# Patient Record
Sex: FEMALE | Race: WHITE | NOT HISPANIC OR LATINO | Employment: UNEMPLOYED | URBAN - METROPOLITAN AREA
[De-identification: names, ages, dates, MRNs, and addresses within clinical notes are randomized per-mention and may not be internally consistent; named-entity substitution may affect disease eponyms.]

---

## 2020-01-01 ENCOUNTER — HOSPITAL ENCOUNTER (INPATIENT)
Facility: HOSPITAL | Age: 80
LOS: 1 days | End: 2020-04-13
Attending: EMERGENCY MEDICINE | Admitting: INTERNAL MEDICINE

## 2020-01-01 ENCOUNTER — APPOINTMENT (OUTPATIENT)
Dept: CT IMAGING | Facility: HOSPITAL | Age: 80
End: 2020-01-01

## 2020-01-01 ENCOUNTER — APPOINTMENT (OUTPATIENT)
Dept: GENERAL RADIOLOGY | Facility: HOSPITAL | Age: 80
End: 2020-01-01

## 2020-01-01 ENCOUNTER — APPOINTMENT (OUTPATIENT)
Dept: CARDIOLOGY | Facility: HOSPITAL | Age: 80
End: 2020-01-01

## 2020-01-01 VITALS
OXYGEN SATURATION: 91 % | RESPIRATION RATE: 16 BRPM | WEIGHT: 128 LBS | DIASTOLIC BLOOD PRESSURE: 43 MMHG | BODY MASS INDEX: 22.68 KG/M2 | HEART RATE: 88 BPM | TEMPERATURE: 97.7 F | SYSTOLIC BLOOD PRESSURE: 116 MMHG | HEIGHT: 63 IN

## 2020-01-01 DIAGNOSIS — A41.9 SEPTIC SHOCK (HCC): ICD-10-CM

## 2020-01-01 DIAGNOSIS — N39.0 ACUTE UTI: ICD-10-CM

## 2020-01-01 DIAGNOSIS — J90 PLEURAL EFFUSION: Primary | ICD-10-CM

## 2020-01-01 DIAGNOSIS — R65.21 SEPTIC SHOCK (HCC): ICD-10-CM

## 2020-01-01 LAB
ALBUMIN SERPL-MCNC: 2.3 G/DL (ref 3.5–5.2)
ALBUMIN SERPL-MCNC: 2.9 G/DL (ref 3.5–5.2)
ALBUMIN/GLOB SERPL: 1 G/DL
ALBUMIN/GLOB SERPL: 1 G/DL
ALP SERPL-CCNC: 52 U/L (ref 39–117)
ALP SERPL-CCNC: 61 U/L (ref 39–117)
ALT SERPL W P-5'-P-CCNC: 18 U/L (ref 1–33)
ALT SERPL W P-5'-P-CCNC: 23 U/L (ref 1–33)
AMPHET+METHAMPHET UR QL: NEGATIVE
ANION GAP SERPL CALCULATED.3IONS-SCNC: 14 MMOL/L (ref 5–15)
ANION GAP SERPL CALCULATED.3IONS-SCNC: 16 MMOL/L (ref 5–15)
ANION GAP SERPL CALCULATED.3IONS-SCNC: 25 MMOL/L (ref 5–15)
ANION GAP SERPL CALCULATED.3IONS-SCNC: 27 MMOL/L (ref 5–15)
ANION GAP SERPL CALCULATED.3IONS-SCNC: 28 MMOL/L (ref 5–15)
ANION GAP SERPL CALCULATED.3IONS-SCNC: 29 MMOL/L (ref 5–15)
APTT PPP: 22 SECONDS (ref 24–31)
ARTERIAL PATENCY WRIST A: ABNORMAL
ARTERIAL PATENCY WRIST A: POSITIVE
ARTERIAL PATENCY WRIST A: POSITIVE
AST SERPL-CCNC: 30 U/L (ref 1–32)
AST SERPL-CCNC: 39 U/L (ref 1–32)
ATMOSPHERIC PRESS: ABNORMAL MM[HG]
B PERT DNA SPEC QL NAA+PROBE: NOT DETECTED
BACTERIA BLD CULT: ABNORMAL
BACTERIA UR QL AUTO: ABNORMAL /HPF
BARBITURATES UR QL SCN: NEGATIVE
BASE DEFICIT: -9.9 MEQ/LITER
BASE EXCESS BLDA CALC-SCNC: -10.5 MMOL/L (ref 0–3)
BASE EXCESS BLDA CALC-SCNC: -10.5 MMOL/L (ref 0–3)
BASE EXCESS BLDA CALC-SCNC: -10.7 MMOL/L (ref 0–3)
BASE EXCESS BLDA CALC-SCNC: -16.2 MMOL/L (ref 0–3)
BASE EXCESS BLDA CALC-SCNC: -3.4 MMOL/L (ref 0–3)
BASE EXCESS BLDA CALC-SCNC: -8.2 MMOL/L (ref 0–3)
BASE EXCESS BLDA CALC-SCNC: -9.8 MMOL/L (ref 0–3)
BASE EXCESS BLDV CALC-SCNC: -2.5 MMOL/L
BASOPHILS # BLD AUTO: 0 10*3/MM3 (ref 0–0.2)
BASOPHILS NFR BLD AUTO: 0.2 % (ref 0–1.5)
BASOPHILS NFR BLD AUTO: 0.2 % (ref 0–1.5)
BASOPHILS NFR BLD AUTO: 0.4 % (ref 0–1.5)
BDY SITE: ABNORMAL
BENZODIAZ UR QL SCN: NEGATIVE
BH CV ECHO MEAS - % IVS THICK: 17 %
BH CV ECHO MEAS - % LVPW THICK: 29.5 %
BH CV ECHO MEAS - ACS: 0.52 CM
BH CV ECHO MEAS - AI DEC SLOPE: 610.9 CM/SEC^2
BH CV ECHO MEAS - AI DEC TIME: 0.5 SEC
BH CV ECHO MEAS - AI MAX PG: 37.7 MMHG
BH CV ECHO MEAS - AI MAX VEL: 306.8 CM/SEC
BH CV ECHO MEAS - AI P1/2T: 147.1 MSEC
BH CV ECHO MEAS - AO MAX PG (FULL): 11.4 MMHG
BH CV ECHO MEAS - AO MAX PG: 13.6 MMHG
BH CV ECHO MEAS - AO MEAN PG (FULL): 5.1 MMHG
BH CV ECHO MEAS - AO MEAN PG: 5.7 MMHG
BH CV ECHO MEAS - AO ROOT AREA (BSA CORRECTED): 1.7
BH CV ECHO MEAS - AO ROOT AREA: 5.5 CM^2
BH CV ECHO MEAS - AO ROOT DIAM: 2.7 CM
BH CV ECHO MEAS - AO V2 MAX: 182.6 CM/SEC
BH CV ECHO MEAS - AO V2 MEAN: 106.1 CM/SEC
BH CV ECHO MEAS - AO V2 VTI: 25.6 CM
BH CV ECHO MEAS - AVA(I,A): 0.87 CM^2
BH CV ECHO MEAS - AVA(I,D): 0.87 CM^2
BH CV ECHO MEAS - AVA(V,A): 1.1 CM^2
BH CV ECHO MEAS - AVA(V,D): 1.1 CM^2
BH CV ECHO MEAS - BSA(HAYCOCK): 1.6 M^2
BH CV ECHO MEAS - BSA: 1.6 M^2
BH CV ECHO MEAS - BZI_BMI: 22.7 KILOGRAMS/M^2
BH CV ECHO MEAS - BZI_METRIC_HEIGHT: 160 CM
BH CV ECHO MEAS - BZI_METRIC_WEIGHT: 58.1 KG
BH CV ECHO MEAS - EDV(CUBED): 110.9 ML
BH CV ECHO MEAS - EDV(MOD-SP2): 89.6 ML
BH CV ECHO MEAS - EDV(MOD-SP4): 71.6 ML
BH CV ECHO MEAS - EDV(TEICH): 107.7 ML
BH CV ECHO MEAS - EF(CUBED): 44.9 %
BH CV ECHO MEAS - EF(MOD-BP): 27 %
BH CV ECHO MEAS - EF(MOD-SP2): 29.2 %
BH CV ECHO MEAS - EF(MOD-SP4): 28.6 %
BH CV ECHO MEAS - EF(TEICH): 37.3 %
BH CV ECHO MEAS - ESV(CUBED): 61.1 ML
BH CV ECHO MEAS - ESV(MOD-SP2): 63.4 ML
BH CV ECHO MEAS - ESV(MOD-SP4): 51.1 ML
BH CV ECHO MEAS - ESV(TEICH): 67.5 ML
BH CV ECHO MEAS - FS: 18 %
BH CV ECHO MEAS - IVS/LVPW: 1.1
BH CV ECHO MEAS - IVSD: 1.2 CM
BH CV ECHO MEAS - IVSS: 1.4 CM
BH CV ECHO MEAS - LA DIMENSION(2D): 4 CM
BH CV ECHO MEAS - LV DIASTOLIC VOL/BSA (35-75): 44.7 ML/M^2
BH CV ECHO MEAS - LV MASS(C)D: 210.8 GRAMS
BH CV ECHO MEAS - LV MASS(C)DI: 131.8 GRAMS/M^2
BH CV ECHO MEAS - LV MASS(C)S: 212.6 GRAMS
BH CV ECHO MEAS - LV MASS(C)SI: 132.9 GRAMS/M^2
BH CV ECHO MEAS - LV MAX PG: 2.1 MMHG
BH CV ECHO MEAS - LV MEAN PG: 0.66 MMHG
BH CV ECHO MEAS - LV SYSTOLIC VOL/BSA (12-30): 31.9 ML/M^2
BH CV ECHO MEAS - LV V1 MAX: 72.7 CM/SEC
BH CV ECHO MEAS - LV V1 MEAN: 36.6 CM/SEC
BH CV ECHO MEAS - LV V1 VTI: 8.3 CM
BH CV ECHO MEAS - LVIDD: 4.8 CM
BH CV ECHO MEAS - LVIDS: 3.9 CM
BH CV ECHO MEAS - LVOT AREA: 2.7 CM^2
BH CV ECHO MEAS - LVOT DIAM: 1.8 CM
BH CV ECHO MEAS - LVPWD: 1.1 CM
BH CV ECHO MEAS - LVPWS: 1.5 CM
BH CV ECHO MEAS - MV A MAX VEL: 24.4 CM/SEC
BH CV ECHO MEAS - MV DEC SLOPE: 964.3 CM/SEC^2
BH CV ECHO MEAS - MV DEC TIME: 0.13 SEC
BH CV ECHO MEAS - MV E MAX VEL: 61.9 CM/SEC
BH CV ECHO MEAS - MV E/A: 2.5
BH CV ECHO MEAS - MV MAX PG: 7.7 MMHG
BH CV ECHO MEAS - MV MEAN PG: 3 MMHG
BH CV ECHO MEAS - MV V2 MAX: 138.4 CM/SEC
BH CV ECHO MEAS - MV V2 MEAN: 82.3 CM/SEC
BH CV ECHO MEAS - MV V2 VTI: 25.1 CM
BH CV ECHO MEAS - MVA(VTI): 0.89 CM^2
BH CV ECHO MEAS - PA ACC TIME: 0.06 SEC
BH CV ECHO MEAS - PA MAX PG (FULL): 0.84 MMHG
BH CV ECHO MEAS - PA MAX PG: 1.2 MMHG
BH CV ECHO MEAS - PA MEAN PG (FULL): 0.46 MMHG
BH CV ECHO MEAS - PA MEAN PG: 0.72 MMHG
BH CV ECHO MEAS - PA PR(ACCEL): 54.1 MMHG
BH CV ECHO MEAS - PA V2 MAX: 55.7 CM/SEC
BH CV ECHO MEAS - PA V2 MEAN: 40 CM/SEC
BH CV ECHO MEAS - PA V2 VTI: 5.3 CM
BH CV ECHO MEAS - RAP SYSTOLE: 3 MMHG
BH CV ECHO MEAS - RV MAX PG: 0.4 MMHG
BH CV ECHO MEAS - RV MEAN PG: 0.26 MMHG
BH CV ECHO MEAS - RV V1 MAX: 31.7 CM/SEC
BH CV ECHO MEAS - RV V1 MEAN: 24.6 CM/SEC
BH CV ECHO MEAS - RV V1 VTI: 3.4 CM
BH CV ECHO MEAS - RVDD: 2.9 CM
BH CV ECHO MEAS - RVSP: 33.2 MMHG
BH CV ECHO MEAS - SI(AO): 88.6 ML/M^2
BH CV ECHO MEAS - SI(CUBED): 31.1 ML/M^2
BH CV ECHO MEAS - SI(LVOT): 13.9 ML/M^2
BH CV ECHO MEAS - SI(MOD-SP2): 16.4 ML/M^2
BH CV ECHO MEAS - SI(MOD-SP4): 12.8 ML/M^2
BH CV ECHO MEAS - SI(TEICH): 25.1 ML/M^2
BH CV ECHO MEAS - SV(AO): 141.7 ML
BH CV ECHO MEAS - SV(CUBED): 49.7 ML
BH CV ECHO MEAS - SV(LVOT): 22.3 ML
BH CV ECHO MEAS - SV(MOD-SP2): 26.2 ML
BH CV ECHO MEAS - SV(MOD-SP4): 20.5 ML
BH CV ECHO MEAS - SV(TEICH): 40.2 ML
BH CV ECHO MEAS - TR MAX VEL: 274.4 CM/SEC
BILIRUB SERPL-MCNC: 0.7 MG/DL (ref 0.2–1.2)
BILIRUB SERPL-MCNC: 0.9 MG/DL (ref 0.2–1.2)
BILIRUB UR QL STRIP: ABNORMAL
BOTTLE TYPE: ABNORMAL
BUN BLD-MCNC: 33 MG/DL (ref 8–23)
BUN BLD-MCNC: 37 MG/DL (ref 8–23)
BUN BLD-MCNC: 38 MG/DL (ref 8–23)
BUN BLD-MCNC: 40 MG/DL (ref 8–23)
BUN BLD-MCNC: 41 MG/DL (ref 8–23)
BUN BLD-MCNC: 42 MG/DL (ref 8–23)
BUN/CREAT SERPL: 19 (ref 7–25)
BUN/CREAT SERPL: 19.1 (ref 7–25)
BUN/CREAT SERPL: 19.2 (ref 7–25)
BUN/CREAT SERPL: 19.6 (ref 7–25)
BUN/CREAT SERPL: 21.7 (ref 7–25)
BUN/CREAT SERPL: 21.9 (ref 7–25)
C PNEUM DNA NPH QL NAA+NON-PROBE: NOT DETECTED
CA-I BLDA-SCNC: 0.98 MMOL/L (ref 1.15–1.33)
CA-I SERPL ISE-MCNC: 1.22 MMOL/L (ref 1.2–1.3)
CALCIUM SPEC-SCNC: 6.9 MG/DL (ref 8.6–10.5)
CALCIUM SPEC-SCNC: 7.2 MG/DL (ref 8.6–10.5)
CALCIUM SPEC-SCNC: 7.5 MG/DL (ref 8.6–10.5)
CALCIUM SPEC-SCNC: 8.4 MG/DL (ref 8.6–10.5)
CALCIUM SPEC-SCNC: 9 MG/DL (ref 8.6–10.5)
CALCIUM SPEC-SCNC: 9.2 MG/DL (ref 8.6–10.5)
CANNABINOIDS SERPL QL: NEGATIVE
CHLORIDE SERPL-SCNC: 101 MMOL/L (ref 98–107)
CHLORIDE SERPL-SCNC: 103 MMOL/L (ref 98–107)
CHLORIDE SERPL-SCNC: 104 MMOL/L (ref 98–107)
CHLORIDE SERPL-SCNC: 107 MMOL/L (ref 98–107)
CHLORIDE SERPL-SCNC: 109 MMOL/L (ref 98–107)
CHLORIDE SERPL-SCNC: 113 MMOL/L (ref 98–107)
CHOLEST SERPL-MCNC: 94 MG/DL (ref 0–200)
CK SERPL-CCNC: 121 U/L (ref 20–180)
CK SERPL-CCNC: 188 U/L (ref 20–180)
CK SERPL-CCNC: 195 U/L (ref 20–180)
CLARITY UR: ABNORMAL
CO2 BLDA-SCNC: 14.7 MMOL/L (ref 22–29)
CO2 BLDA-SCNC: 18 MMOL/L (ref 22–29)
CO2 BLDA-SCNC: 19.7 MMOL/L (ref 22–29)
CO2 BLDA-SCNC: 23.5 MMOL/L (ref 22–29)
CO2 BLDA-SCNC: 27.6 MMOL/L (ref 22–29)
CO2 BLDA-SCNC: 29.7 MMOL/L (ref 22–29)
CO2 SERPL-SCNC: 13 MMOL/L (ref 22–29)
CO2 SERPL-SCNC: 13 MMOL/L (ref 22–29)
CO2 SERPL-SCNC: 16 MMOL/L (ref 22–29)
CO2 SERPL-SCNC: 17 MMOL/L (ref 22–29)
CO2 SERPL-SCNC: 17 MMOL/L (ref 22–29)
CO2 SERPL-SCNC: 27 MMOL/L (ref 22–29)
COCAINE UR QL: NEGATIVE
COLOR UR: ABNORMAL
CREAT BLD-MCNC: 1.52 MG/DL (ref 0.57–1)
CREAT BLD-MCNC: 1.69 MG/DL (ref 0.57–1)
CREAT BLD-MCNC: 1.94 MG/DL (ref 0.57–1)
CREAT BLD-MCNC: 2.1 MG/DL (ref 0.57–1)
CREAT BLD-MCNC: 2.14 MG/DL (ref 0.57–1)
CREAT BLD-MCNC: 2.2 MG/DL (ref 0.57–1)
CRP SERPL-MCNC: 3.24 MG/DL (ref 0–0.5)
CRP SERPL-MCNC: 5.11 MG/DL (ref 0–0.5)
D-LACTATE SERPL-SCNC: 1.7 MMOL/L (ref 0.5–2)
D-LACTATE SERPL-SCNC: 11.9 MMOL/L (ref 0.5–2)
D-LACTATE SERPL-SCNC: 2.2 MMOL/L (ref 0.5–2)
D-LACTATE SERPL-SCNC: 2.9 MMOL/L (ref 0.5–2)
D-LACTATE SERPL-SCNC: 6.7 MMOL/L (ref 0.5–2)
DEPRECATED RDW RBC AUTO: 63 FL (ref 37–54)
DEPRECATED RDW RBC AUTO: 67.8 FL (ref 37–54)
DEPRECATED RDW RBC AUTO: 70 FL (ref 37–54)
DEPRECATED RDW RBC AUTO: 70 FL (ref 37–54)
EOSINOPHIL # BLD AUTO: 0 10*3/MM3 (ref 0–0.4)
EOSINOPHIL NFR BLD AUTO: 0 % (ref 0.3–6.2)
EOSINOPHIL NFR BLD AUTO: 0 % (ref 0.3–6.2)
EOSINOPHIL NFR BLD AUTO: 0.1 % (ref 0.3–6.2)
ERYTHROCYTE [DISTWIDTH] IN BLOOD BY AUTOMATED COUNT: 17.7 % (ref 12.3–15.4)
ERYTHROCYTE [DISTWIDTH] IN BLOOD BY AUTOMATED COUNT: 18.7 % (ref 12.3–15.4)
ERYTHROCYTE [DISTWIDTH] IN BLOOD BY AUTOMATED COUNT: 18.8 % (ref 12.3–15.4)
ERYTHROCYTE [DISTWIDTH] IN BLOOD BY AUTOMATED COUNT: 18.9 % (ref 12.3–15.4)
ERYTHROCYTE [SEDIMENTATION RATE] IN BLOOD: 10 MM/HR (ref 0–30)
ETHANOL UR QL: <0.01 %
FERRITIN SERPL-MCNC: 2603 NG/ML (ref 13–150)
FLUAV H1 2009 PAND RNA NPH QL NAA+PROBE: NOT DETECTED
FLUAV H1 HA GENE NPH QL NAA+PROBE: NOT DETECTED
FLUAV H3 RNA NPH QL NAA+PROBE: NOT DETECTED
FLUAV SUBTYP SPEC NAA+PROBE: NOT DETECTED
FLUBV RNA ISLT QL NAA+PROBE: NOT DETECTED
GFR SERPL CREATININE-BSD FRML MDRD: 22 ML/MIN/1.73
GFR SERPL CREATININE-BSD FRML MDRD: 22 ML/MIN/1.73
GFR SERPL CREATININE-BSD FRML MDRD: 23 ML/MIN/1.73
GFR SERPL CREATININE-BSD FRML MDRD: 25 ML/MIN/1.73
GFR SERPL CREATININE-BSD FRML MDRD: 29 ML/MIN/1.73
GFR SERPL CREATININE-BSD FRML MDRD: 33 ML/MIN/1.73
GLOBULIN UR ELPH-MCNC: 2.3 GM/DL
GLOBULIN UR ELPH-MCNC: 3 GM/DL
GLUCOSE BLD-MCNC: 133 MG/DL (ref 65–99)
GLUCOSE BLD-MCNC: 206 MG/DL (ref 65–99)
GLUCOSE BLD-MCNC: 207 MG/DL (ref 65–99)
GLUCOSE BLD-MCNC: 81 MG/DL (ref 65–99)
GLUCOSE BLD-MCNC: 87 MG/DL (ref 65–99)
GLUCOSE BLD-MCNC: 95 MG/DL (ref 65–99)
GLUCOSE BLDC GLUCOMTR-MCNC: 121 MG/DL (ref 74–100)
GLUCOSE BLDC GLUCOMTR-MCNC: 92 MG/DL (ref 70–105)
GLUCOSE UR STRIP-MCNC: NEGATIVE MG/DL
HADV DNA SPEC NAA+PROBE: NOT DETECTED
HCO3 BLDA-SCNC: 13.4 MMOL/L (ref 21–28)
HCO3 BLDA-SCNC: 16.7 MMOL/L (ref 21–28)
HCO3 BLDA-SCNC: 18.1 MMOL/L (ref 21–28)
HCO3 BLDA-SCNC: 18.2 MMOL/L (ref 21–28)
HCO3 BLDA-SCNC: 21.6 MMOL/L (ref 21–28)
HCO3 BLDA-SCNC: 25.7 MMOL/L (ref 21–28)
HCO3 BLDV-SCNC: 27.5 MMOL/L
HCO3 MIXED: 18.3 MMOL/L (ref 21–29)
HCOV 229E RNA SPEC QL NAA+PROBE: NOT DETECTED
HCOV HKU1 RNA SPEC QL NAA+PROBE: NOT DETECTED
HCOV NL63 RNA SPEC QL NAA+PROBE: NOT DETECTED
HCOV OC43 RNA SPEC QL NAA+PROBE: NOT DETECTED
HCT VFR BLD AUTO: 36.2 % (ref 34–46.6)
HCT VFR BLD AUTO: 36.7 % (ref 34–46.6)
HCT VFR BLD AUTO: 38.1 % (ref 34–46.6)
HCT VFR BLD AUTO: 38.9 % (ref 34–46.6)
HCT VFR BLDA CALC: 40 % (ref 38–51)
HDLC SERPL-MCNC: 37 MG/DL (ref 40–60)
HEMOCCULT STL QL IA: POSITIVE
HEMODILUTION: NO
HGB BLD-MCNC: 11.7 G/DL (ref 12–15.9)
HGB BLD-MCNC: 11.9 G/DL (ref 12–15.9)
HGB BLD-MCNC: 12.1 G/DL (ref 12–15.9)
HGB BLD-MCNC: 13.2 G/DL (ref 12–15.9)
HGB BLDA-MCNC: 13.8 G/DL (ref 12–17)
HGB UR QL STRIP.AUTO: ABNORMAL
HMPV RNA NPH QL NAA+NON-PROBE: NOT DETECTED
HOLD SPECIMEN: NORMAL
HOROWITZ INDEX BLD+IHG-RTO: 100 %
HOROWITZ INDEX BLD+IHG-RTO: 28 %
HOROWITZ INDEX BLD+IHG-RTO: 70 %
HOROWITZ INDEX BLD+IHG-RTO: <21 %
HPIV1 RNA SPEC QL NAA+PROBE: NOT DETECTED
HPIV2 RNA SPEC QL NAA+PROBE: NOT DETECTED
HPIV3 RNA NPH QL NAA+PROBE: NOT DETECTED
HPIV4 P GENE NPH QL NAA+PROBE: NOT DETECTED
HYALINE CASTS UR QL AUTO: ABNORMAL /LPF
INR PPP: 1.09 (ref 0.9–1.1)
KETONES UR QL STRIP: ABNORMAL
L PNEUMO1 AG UR QL IA: NEGATIVE
LACTATE HOLD SPECIMEN: NORMAL
LDH SERPL-CCNC: 330 U/L (ref 135–214)
LDH SERPL-CCNC: 382 U/L (ref 135–214)
LDLC SERPL CALC-MCNC: 36 MG/DL (ref 0–100)
LDLC/HDLC SERPL: 0.97 {RATIO}
LEUKOCYTE ESTERASE UR QL STRIP.AUTO: ABNORMAL
LYMPHOCYTES # BLD AUTO: 0.2 10*3/MM3 (ref 0.7–3.1)
LYMPHOCYTES # BLD AUTO: 0.3 10*3/MM3 (ref 0.7–3.1)
LYMPHOCYTES # BLD AUTO: 0.5 10*3/MM3 (ref 0.7–3.1)
LYMPHOCYTES NFR BLD AUTO: 2.6 % (ref 19.6–45.3)
LYMPHOCYTES NFR BLD AUTO: 3 % (ref 19.6–45.3)
LYMPHOCYTES NFR BLD AUTO: 5.4 % (ref 19.6–45.3)
M PNEUMO IGG SER IA-ACNC: NOT DETECTED
MAGNESIUM SERPL-MCNC: 1.6 MG/DL (ref 1.6–2.4)
MAGNESIUM SERPL-MCNC: 1.7 MG/DL (ref 1.6–2.4)
MAGNESIUM SERPL-MCNC: 1.8 MG/DL (ref 1.6–2.4)
MAGNESIUM SERPL-MCNC: 1.8 MG/DL (ref 1.6–2.4)
MAXIMAL PREDICTED HEART RATE: 141 BPM
MCH RBC QN AUTO: 33.4 PG (ref 26.6–33)
MCH RBC QN AUTO: 33.6 PG (ref 26.6–33)
MCH RBC QN AUTO: 33.8 PG (ref 26.6–33)
MCH RBC QN AUTO: 34.4 PG (ref 26.6–33)
MCHC RBC AUTO-ENTMCNC: 31.8 G/DL (ref 31.5–35.7)
MCHC RBC AUTO-ENTMCNC: 32.2 G/DL (ref 31.5–35.7)
MCHC RBC AUTO-ENTMCNC: 32.3 G/DL (ref 31.5–35.7)
MCHC RBC AUTO-ENTMCNC: 33.9 G/DL (ref 31.5–35.7)
MCV RBC AUTO: 101.4 FL (ref 79–97)
MCV RBC AUTO: 104.6 FL (ref 79–97)
MCV RBC AUTO: 104.6 FL (ref 79–97)
MCV RBC AUTO: 105.1 FL (ref 79–97)
METHADONE UR QL SCN: NEGATIVE
MODALITY: ABNORMAL
MONOCYTES # BLD AUTO: 0.6 10*3/MM3 (ref 0.1–0.9)
MONOCYTES # BLD AUTO: 0.6 10*3/MM3 (ref 0.1–0.9)
MONOCYTES # BLD AUTO: 0.7 10*3/MM3 (ref 0.1–0.9)
MONOCYTES NFR BLD AUTO: 6.2 % (ref 5–12)
MONOCYTES NFR BLD AUTO: 6.5 % (ref 5–12)
MONOCYTES NFR BLD AUTO: 7.9 % (ref 5–12)
MRSA DNA SPEC QL NAA+PROBE: NORMAL
MYOGLOBIN SERPL-MCNC: 370.2 NG/ML (ref 25–58)
NEUTROPHILS # BLD AUTO: 10.1 10*3/MM3 (ref 1.7–7)
NEUTROPHILS # BLD AUTO: 7 10*3/MM3 (ref 1.7–7)
NEUTROPHILS # BLD AUTO: 8.1 10*3/MM3 (ref 1.7–7)
NEUTROPHILS NFR BLD AUTO: 87.8 % (ref 42.7–76)
NEUTROPHILS NFR BLD AUTO: 89.3 % (ref 42.7–76)
NEUTROPHILS NFR BLD AUTO: 90.4 % (ref 42.7–76)
NITRITE UR QL STRIP: POSITIVE
NRBC BLD AUTO-RTO: 0.2 /100 WBC (ref 0–0.2)
NRBC BLD AUTO-RTO: 1.2 /100 WBC (ref 0–0.2)
NRBC BLD AUTO-RTO: 1.9 /100 WBC (ref 0–0.2)
NT-PROBNP SERPL-MCNC: ABNORMAL PG/ML (ref 5–1800)
O2 SATURATION MIXED: 59.5 %
OPIATES UR QL: NEGATIVE
OXYCODONE UR QL SCN: NEGATIVE
PCO2 BLDA: 40.8 MM HG (ref 35–48)
PCO2 BLDA: 44.8 MM HG (ref 35–48)
PCO2 BLDA: 50.2 MM HG (ref 35–48)
PCO2 BLDA: 51.3 MM HG (ref 35–48)
PCO2 BLDA: 61.8 MM HG (ref 35–48)
PCO2 BLDA: 63.2 MM HG (ref 35–48)
PCO2 BLDV: 72.3 MM HG (ref 42–51)
PCO2 MIXED: 47.4 MMHG (ref 35–51)
PEEP RESPIRATORY: 10 CM[H2O]
PEEP RESPIRATORY: 5 CM[H2O]
PH BLDA: 7.08 PH UNITS (ref 7.35–7.45)
PH BLDA: 7.14 PH UNITS (ref 7.35–7.45)
PH BLDA: 7.16 PH UNITS (ref 7.35–7.45)
PH BLDA: 7.17 PH UNITS (ref 7.35–7.45)
PH BLDA: 7.22 PH UNITS (ref 7.35–7.45)
PH BLDA: 7.23 PH UNITS (ref 7.35–7.45)
PH BLDV: 7.19 PH UNITS (ref 7.32–7.43)
PH MIXED: 7.19 PH UNITS (ref 7.32–7.45)
PH UR STRIP.AUTO: 5.5 [PH] (ref 5–8)
PHOSPHATE SERPL-MCNC: 5.6 MG/DL (ref 2.5–4.5)
PHOSPHATE SERPL-MCNC: 6.5 MG/DL (ref 2.5–4.5)
PHOSPHATE SERPL-MCNC: 7 MG/DL (ref 2.5–4.5)
PLATELET # BLD AUTO: 133 10*3/MM3 (ref 140–450)
PLATELET # BLD AUTO: 152 10*3/MM3 (ref 140–450)
PLATELET # BLD AUTO: 153 10*3/MM3 (ref 140–450)
PLATELET # BLD AUTO: 158 10*3/MM3 (ref 140–450)
PMV BLD AUTO: 10.2 FL (ref 6–12)
PMV BLD AUTO: 9.8 FL (ref 6–12)
PO2 BLDA: 124.4 MM HG (ref 83–108)
PO2 BLDA: 61.3 MM HG (ref 83–108)
PO2 BLDA: 63.4 MM HG (ref 83–108)
PO2 BLDA: 64.9 MM HG (ref 83–108)
PO2 BLDA: 67 MM HG (ref 83–108)
PO2 BLDA: 93.1 MM HG (ref 83–108)
PO2 BLDV: 20.8 MM HG
PO2 MIXED: 38.1 MMHG
POTASSIUM BLD-SCNC: 4.6 MMOL/L (ref 3.5–5.2)
POTASSIUM BLD-SCNC: 4.7 MMOL/L (ref 3.5–5.2)
POTASSIUM BLD-SCNC: 4.9 MMOL/L (ref 3.5–5.2)
POTASSIUM BLD-SCNC: 5 MMOL/L (ref 3.5–5.2)
POTASSIUM BLD-SCNC: 5.1 MMOL/L (ref 3.5–5.2)
POTASSIUM BLD-SCNC: 5.6 MMOL/L (ref 3.5–5.2)
POTASSIUM BLDA-SCNC: 5.2 MMOL/L (ref 3.5–4.5)
PROCALCITONIN SERPL-MCNC: 0.25 NG/ML (ref 0.1–0.25)
PROT SERPL-MCNC: 4.6 G/DL (ref 6–8.5)
PROT SERPL-MCNC: 5.9 G/DL (ref 6–8.5)
PROT UR QL STRIP: ABNORMAL
PROTHROMBIN TIME: 11.3 SECONDS (ref 9.6–11.7)
RBC # BLD AUTO: 3.46 10*6/MM3 (ref 3.77–5.28)
RBC # BLD AUTO: 3.51 10*6/MM3 (ref 3.77–5.28)
RBC # BLD AUTO: 3.63 10*6/MM3 (ref 3.77–5.28)
RBC # BLD AUTO: 3.84 10*6/MM3 (ref 3.77–5.28)
RBC # UR: ABNORMAL /HPF
REF LAB TEST METHOD: ABNORMAL
RESPIRATORY RATE: 24
RESPIRATORY RATE: 30
RESPIRATORY RATE: 30
RESPIRATORY RATE: 34
RHINOVIRUS RNA SPEC NAA+PROBE: NOT DETECTED
RSV RNA NPH QL NAA+NON-PROBE: NOT DETECTED
S PNEUM AG SPEC QL LA: NEGATIVE
SAO2 % BLDCOA: 83.5 % (ref 94–98)
SAO2 % BLDCOA: 83.7 % (ref 94–98)
SAO2 % BLDCOA: 84.1 % (ref 94–98)
SAO2 % BLDCOA: 87.3 % (ref 94–98)
SAO2 % BLDCOA: 94.4 % (ref 94–98)
SAO2 % BLDCOA: 98 % (ref 94–98)
SAO2 % BLDCOV: 22.5 %
SARS-COV-2 RNA RESP QL NAA+PROBE: NOT DETECTED
SET MECH RESP RATE: 34
SODIUM BLD-SCNC: 143 MMOL/L (ref 136–145)
SODIUM BLD-SCNC: 144 MMOL/L (ref 136–145)
SODIUM BLD-SCNC: 146 MMOL/L (ref 136–145)
SODIUM BLD-SCNC: 147 MMOL/L (ref 136–145)
SODIUM BLD-SCNC: 147 MMOL/L (ref 136–145)
SODIUM BLD-SCNC: 152 MMOL/L (ref 136–145)
SODIUM BLDA-SCNC: 140 MMOL/L (ref 138–146)
SP GR UR STRIP: 1.02 (ref 1–1.03)
SQUAMOUS #/AREA URNS HPF: ABNORMAL /HPF
STRESS TARGET HR: 120 BPM
TRIGL SERPL-MCNC: 106 MG/DL (ref 0–150)
TROPONIN T SERPL-MCNC: 0.1 NG/ML (ref 0–0.03)
TROPONIN T SERPL-MCNC: 0.12 NG/ML (ref 0–0.03)
TROPONIN T SERPL-MCNC: 0.12 NG/ML (ref 0–0.03)
TROPONIN T SERPL-MCNC: 0.19 NG/ML (ref 0–0.03)
TSH SERPL DL<=0.05 MIU/L-ACNC: 3.76 UIU/ML (ref 0.27–4.2)
URINE MYOGLOBIN, QUALITATIVE: POSITIVE
UROBILINOGEN UR QL STRIP: ABNORMAL
VANCOMYCIN SERPL-MCNC: 7.4 MCG/ML (ref 5–40)
VENTILATOR MODE: ABNORMAL
VLDLC SERPL-MCNC: 21.2 MG/DL
VT ON VENT VENT: 400 ML
WBC NRBC COR # BLD: 10.5 10*3/MM3 (ref 3.4–10.8)
WBC NRBC COR # BLD: 11.1 10*3/MM3 (ref 3.4–10.8)
WBC NRBC COR # BLD: 7.9 10*3/MM3 (ref 3.4–10.8)
WBC NRBC COR # BLD: 9.3 10*3/MM3 (ref 3.4–10.8)
WBC UR QL AUTO: ABNORMAL /HPF
WHOLE BLOOD HOLD SPECIMEN: NORMAL

## 2020-01-01 PROCEDURE — 25010000002 FENTANYL CITRATE (PF) 100 MCG/2ML SOLUTION

## 2020-01-01 PROCEDURE — 83735 ASSAY OF MAGNESIUM: CPT | Performed by: NURSE PRACTITIONER

## 2020-01-01 PROCEDURE — 93306 TTE W/DOPPLER COMPLETE: CPT

## 2020-01-01 PROCEDURE — 87186 SC STD MICRODIL/AGAR DIL: CPT | Performed by: EMERGENCY MEDICINE

## 2020-01-01 PROCEDURE — 25010000002 CEFEPIME PER 500 MG: Performed by: EMERGENCY MEDICINE

## 2020-01-01 PROCEDURE — 82550 ASSAY OF CK (CPK): CPT | Performed by: NURSE PRACTITIONER

## 2020-01-01 PROCEDURE — 71045 X-RAY EXAM CHEST 1 VIEW: CPT

## 2020-01-01 PROCEDURE — 87899 AGENT NOS ASSAY W/OPTIC: CPT | Performed by: NURSE PRACTITIONER

## 2020-01-01 PROCEDURE — 80307 DRUG TEST PRSMV CHEM ANLYZR: CPT | Performed by: EMERGENCY MEDICINE

## 2020-01-01 PROCEDURE — 25010000002 EPINEPHRINE 1 MG/ML SOLUTION 30 ML VIAL: Performed by: NURSE PRACTITIONER

## 2020-01-01 PROCEDURE — 82803 BLOOD GASES ANY COMBINATION: CPT

## 2020-01-01 PROCEDURE — 85730 THROMBOPLASTIN TIME PARTIAL: CPT | Performed by: EMERGENCY MEDICINE

## 2020-01-01 PROCEDURE — 85025 COMPLETE CBC W/AUTO DIFF WBC: CPT | Performed by: EMERGENCY MEDICINE

## 2020-01-01 PROCEDURE — 80048 BASIC METABOLIC PNL TOTAL CA: CPT | Performed by: NURSE PRACTITIONER

## 2020-01-01 PROCEDURE — 70450 CT HEAD/BRAIN W/O DYE: CPT

## 2020-01-01 PROCEDURE — 85027 COMPLETE CBC AUTOMATED: CPT | Performed by: NURSE PRACTITIONER

## 2020-01-01 PROCEDURE — 83615 LACTATE (LD) (LDH) ENZYME: CPT | Performed by: EMERGENCY MEDICINE

## 2020-01-01 PROCEDURE — P9612 CATHETERIZE FOR URINE SPEC: HCPCS

## 2020-01-01 PROCEDURE — 36600 WITHDRAWAL OF ARTERIAL BLOOD: CPT

## 2020-01-01 PROCEDURE — 0BH17EZ INSERTION OF ENDOTRACHEAL AIRWAY INTO TRACHEA, VIA NATURAL OR ARTIFICIAL OPENING: ICD-10-PCS | Performed by: NURSE PRACTITIONER

## 2020-01-01 PROCEDURE — 85652 RBC SED RATE AUTOMATED: CPT | Performed by: INTERNAL MEDICINE

## 2020-01-01 PROCEDURE — 0099U HC BIOFIRE FILMARRAY RESP PANEL 1: CPT | Performed by: EMERGENCY MEDICINE

## 2020-01-01 PROCEDURE — 82728 ASSAY OF FERRITIN: CPT | Performed by: INTERNAL MEDICINE

## 2020-01-01 PROCEDURE — 25010000002 DOPAMINE PER 40 MG: Performed by: NURSE PRACTITIONER

## 2020-01-01 PROCEDURE — 84443 ASSAY THYROID STIM HORMONE: CPT | Performed by: EMERGENCY MEDICINE

## 2020-01-01 PROCEDURE — 74018 RADEX ABDOMEN 1 VIEW: CPT

## 2020-01-01 PROCEDURE — P9047 ALBUMIN (HUMAN), 25%, 50ML: HCPCS | Performed by: NURSE PRACTITIONER

## 2020-01-01 PROCEDURE — 87077 CULTURE AEROBIC IDENTIFY: CPT | Performed by: EMERGENCY MEDICINE

## 2020-01-01 PROCEDURE — 94799 UNLISTED PULMONARY SVC/PX: CPT

## 2020-01-01 PROCEDURE — 82550 ASSAY OF CK (CPK): CPT | Performed by: EMERGENCY MEDICINE

## 2020-01-01 PROCEDURE — 86140 C-REACTIVE PROTEIN: CPT | Performed by: EMERGENCY MEDICINE

## 2020-01-01 PROCEDURE — 25010000002 EPINEPHRINE PF 1 MG/10ML SOLUTION PREFILLED SYRINGE: Performed by: NURSE PRACTITIONER

## 2020-01-01 PROCEDURE — 93306 TTE W/DOPPLER COMPLETE: CPT | Performed by: INTERNAL MEDICINE

## 2020-01-01 PROCEDURE — 92950 HEART/LUNG RESUSCITATION CPR: CPT

## 2020-01-01 PROCEDURE — 25010000002 HYDROCORTISONE SODIUM SUCCINATE 100 MG RECONSTITUTED SOLUTION: Performed by: NURSE PRACTITIONER

## 2020-01-01 PROCEDURE — 25010000002 VANCOMYCIN 750 MG RECONSTITUTED SOLUTION 1 EACH VIAL: Performed by: INTERNAL MEDICINE

## 2020-01-01 PROCEDURE — 25010000002 HEPARIN (PORCINE) PER 1000 UNITS: Performed by: NURSE PRACTITIONER

## 2020-01-01 PROCEDURE — 25010000002 VANCOMYCIN 750 MG RECONSTITUTED SOLUTION 1 EACH VIAL: Performed by: EMERGENCY MEDICINE

## 2020-01-01 PROCEDURE — 25010000002 PHENYLEPHRINE 10 MG/ML SOLUTION: Performed by: NURSE PRACTITIONER

## 2020-01-01 PROCEDURE — 85025 COMPLETE CBC W/AUTO DIFF WBC: CPT | Performed by: NURSE PRACTITIONER

## 2020-01-01 PROCEDURE — 84100 ASSAY OF PHOSPHORUS: CPT | Performed by: EMERGENCY MEDICINE

## 2020-01-01 PROCEDURE — C1751 CATH, INF, PER/CENT/MIDLINE: HCPCS

## 2020-01-01 PROCEDURE — 87086 URINE CULTURE/COLONY COUNT: CPT | Performed by: EMERGENCY MEDICINE

## 2020-01-01 PROCEDURE — 83874 ASSAY OF MYOGLOBIN: CPT | Performed by: NURSE PRACTITIONER

## 2020-01-01 PROCEDURE — 87040 BLOOD CULTURE FOR BACTERIA: CPT | Performed by: EMERGENCY MEDICINE

## 2020-01-01 PROCEDURE — 80053 COMPREHEN METABOLIC PANEL: CPT | Performed by: NURSE PRACTITIONER

## 2020-01-01 PROCEDURE — 93005 ELECTROCARDIOGRAM TRACING: CPT | Performed by: EMERGENCY MEDICINE

## 2020-01-01 PROCEDURE — 84484 ASSAY OF TROPONIN QUANT: CPT | Performed by: EMERGENCY MEDICINE

## 2020-01-01 PROCEDURE — 85018 HEMOGLOBIN: CPT

## 2020-01-01 PROCEDURE — 84100 ASSAY OF PHOSPHORUS: CPT | Performed by: NURSE PRACTITIONER

## 2020-01-01 PROCEDURE — 99285 EMERGENCY DEPT VISIT HI MDM: CPT

## 2020-01-01 PROCEDURE — 25010000002 MIDAZOLAM PER 1 MG

## 2020-01-01 PROCEDURE — 82274 ASSAY TEST FOR BLOOD FECAL: CPT | Performed by: INTERNAL MEDICINE

## 2020-01-01 PROCEDURE — 87150 DNA/RNA AMPLIFIED PROBE: CPT | Performed by: EMERGENCY MEDICINE

## 2020-01-01 PROCEDURE — 03HY32Z INSERTION OF MONITORING DEVICE INTO UPPER ARTERY, PERCUTANEOUS APPROACH: ICD-10-PCS | Performed by: NURSE PRACTITIONER

## 2020-01-01 PROCEDURE — 25010000002 CEFEPIME PER 500 MG: Performed by: INTERNAL MEDICINE

## 2020-01-01 PROCEDURE — 25010000002 ALBUMIN HUMAN 25% PER 50 ML: Performed by: NURSE PRACTITIONER

## 2020-01-01 PROCEDURE — 82330 ASSAY OF CALCIUM: CPT

## 2020-01-01 PROCEDURE — 81001 URINALYSIS AUTO W/SCOPE: CPT | Performed by: EMERGENCY MEDICINE

## 2020-01-01 PROCEDURE — 86140 C-REACTIVE PROTEIN: CPT | Performed by: INTERNAL MEDICINE

## 2020-01-01 PROCEDURE — 87641 MR-STAPH DNA AMP PROBE: CPT | Performed by: NURSE PRACTITIONER

## 2020-01-01 PROCEDURE — 80053 COMPREHEN METABOLIC PANEL: CPT | Performed by: EMERGENCY MEDICINE

## 2020-01-01 PROCEDURE — 94640 AIRWAY INHALATION TREATMENT: CPT

## 2020-01-01 PROCEDURE — 82330 ASSAY OF CALCIUM: CPT | Performed by: EMERGENCY MEDICINE

## 2020-01-01 PROCEDURE — 05HM33Z INSERTION OF INFUSION DEVICE INTO RIGHT INTERNAL JUGULAR VEIN, PERCUTANEOUS APPROACH: ICD-10-PCS | Performed by: NURSE PRACTITIONER

## 2020-01-01 PROCEDURE — 25010000002 DOPAMINE PER 40 MG

## 2020-01-01 PROCEDURE — 83735 ASSAY OF MAGNESIUM: CPT | Performed by: EMERGENCY MEDICINE

## 2020-01-01 PROCEDURE — 87147 CULTURE TYPE IMMUNOLOGIC: CPT | Performed by: EMERGENCY MEDICINE

## 2020-01-01 PROCEDURE — 87205 SMEAR GRAM STAIN: CPT | Performed by: NURSE PRACTITIONER

## 2020-01-01 PROCEDURE — 83605 ASSAY OF LACTIC ACID: CPT

## 2020-01-01 PROCEDURE — 84484 ASSAY OF TROPONIN QUANT: CPT | Performed by: NURSE PRACTITIONER

## 2020-01-01 PROCEDURE — 80048 BASIC METABOLIC PNL TOTAL CA: CPT | Performed by: INTERNAL MEDICINE

## 2020-01-01 PROCEDURE — 83880 ASSAY OF NATRIURETIC PEPTIDE: CPT | Performed by: EMERGENCY MEDICINE

## 2020-01-01 PROCEDURE — 94003 VENT MGMT INPAT SUBQ DAY: CPT

## 2020-01-01 PROCEDURE — 71250 CT THORAX DX C-: CPT

## 2020-01-01 PROCEDURE — 83605 ASSAY OF LACTIC ACID: CPT | Performed by: NURSE PRACTITIONER

## 2020-01-01 PROCEDURE — 94002 VENT MGMT INPAT INIT DAY: CPT

## 2020-01-01 PROCEDURE — 87635 SARS-COV-2 COVID-19 AMP PRB: CPT | Performed by: EMERGENCY MEDICINE

## 2020-01-01 PROCEDURE — 25010000002 MIDAZOLAM 50 MG/10ML SOLUTION: Performed by: NURSE PRACTITIONER

## 2020-01-01 PROCEDURE — 80061 LIPID PANEL: CPT | Performed by: NURSE PRACTITIONER

## 2020-01-01 PROCEDURE — 80051 ELECTROLYTE PANEL: CPT

## 2020-01-01 PROCEDURE — 80202 ASSAY OF VANCOMYCIN: CPT | Performed by: INTERNAL MEDICINE

## 2020-01-01 PROCEDURE — 85610 PROTHROMBIN TIME: CPT | Performed by: EMERGENCY MEDICINE

## 2020-01-01 PROCEDURE — 83615 LACTATE (LD) (LDH) ENZYME: CPT | Performed by: INTERNAL MEDICINE

## 2020-01-01 PROCEDURE — 82962 GLUCOSE BLOOD TEST: CPT

## 2020-01-01 PROCEDURE — 84145 PROCALCITONIN (PCT): CPT | Performed by: EMERGENCY MEDICINE

## 2020-01-01 PROCEDURE — 87070 CULTURE OTHR SPECIMN AEROBIC: CPT | Performed by: NURSE PRACTITIONER

## 2020-01-01 RX ORDER — GLYCOPYRROLATE 0.2 MG/ML
0.2 INJECTION INTRAMUSCULAR; INTRAVENOUS
Status: DISCONTINUED | OUTPATIENT
Start: 2020-01-01 | End: 2020-01-01 | Stop reason: HOSPADM

## 2020-01-01 RX ORDER — ASPIRIN 81 MG/1
324 TABLET, CHEWABLE ORAL ONCE
Status: COMPLETED | OUTPATIENT
Start: 2020-01-01 | End: 2020-01-01

## 2020-01-01 RX ORDER — ALBUMIN (HUMAN) 12.5 G/50ML
25 SOLUTION INTRAVENOUS ONCE
Status: COMPLETED | OUTPATIENT
Start: 2020-01-01 | End: 2020-01-01

## 2020-01-01 RX ORDER — LORAZEPAM 2 MG/ML
1 INJECTION INTRAMUSCULAR
Status: DISCONTINUED | OUTPATIENT
Start: 2020-01-01 | End: 2020-01-01 | Stop reason: HOSPADM

## 2020-01-01 RX ORDER — MIDAZOLAM HYDROCHLORIDE 1 MG/ML
INJECTION INTRAMUSCULAR; INTRAVENOUS
Status: COMPLETED
Start: 2020-01-01 | End: 2020-01-01

## 2020-01-01 RX ORDER — PANTOPRAZOLE SODIUM 40 MG/10ML
40 INJECTION, POWDER, LYOPHILIZED, FOR SOLUTION INTRAVENOUS
Status: DISCONTINUED | OUTPATIENT
Start: 2020-01-01 | End: 2020-01-01

## 2020-01-01 RX ORDER — BUMETANIDE 0.25 MG/ML
3 INJECTION INTRAMUSCULAR; INTRAVENOUS ONCE
Status: COMPLETED | OUTPATIENT
Start: 2020-01-01 | End: 2020-01-01

## 2020-01-01 RX ORDER — MIDAZOLAM HYDROCHLORIDE 1 MG/ML
2 INJECTION INTRAMUSCULAR; INTRAVENOUS ONCE
Status: COMPLETED | OUTPATIENT
Start: 2020-01-01 | End: 2020-01-01

## 2020-01-01 RX ORDER — NOREPINEPHRINE BIT/0.9 % NACL 8 MG/250ML
.02-.3 INFUSION BOTTLE (ML) INTRAVENOUS
Status: DISCONTINUED | OUTPATIENT
Start: 2020-01-01 | End: 2020-01-01

## 2020-01-01 RX ORDER — MIDAZOLAM HCL IN 0.9 % NACL/PF 1 MG/ML
1-10 PLASTIC BAG, INJECTION (ML) INTRAVENOUS
Status: DISCONTINUED | OUTPATIENT
Start: 2020-01-01 | End: 2020-01-01

## 2020-01-01 RX ORDER — PROPOFOL 10 MG/ML
VIAL (ML) INTRAVENOUS
Status: COMPLETED
Start: 2020-01-01 | End: 2020-01-01

## 2020-01-01 RX ORDER — VECURONIUM BROMIDE 1 MG/ML
8 INJECTION, POWDER, LYOPHILIZED, FOR SOLUTION INTRAVENOUS ONCE
Status: COMPLETED | OUTPATIENT
Start: 2020-01-01 | End: 2020-01-01

## 2020-01-01 RX ORDER — ACETAMINOPHEN 325 MG/1
650 TABLET ORAL EVERY 4 HOURS PRN
Status: DISCONTINUED | OUTPATIENT
Start: 2020-01-01 | End: 2020-01-01 | Stop reason: HOSPADM

## 2020-01-01 RX ORDER — SODIUM CHLORIDE 0.9 % (FLUSH) 0.9 %
10 SYRINGE (ML) INJECTION AS NEEDED
Status: DISCONTINUED | OUTPATIENT
Start: 2020-01-01 | End: 2020-01-01

## 2020-01-01 RX ORDER — MORPHINE SULFATE 4 MG/ML
4 INJECTION, SOLUTION INTRAMUSCULAR; INTRAVENOUS
Status: DISCONTINUED | OUTPATIENT
Start: 2020-01-01 | End: 2020-01-01 | Stop reason: HOSPADM

## 2020-01-01 RX ORDER — SODIUM CHLORIDE 450 MG/100ML
500 INJECTION, SOLUTION INTRAVENOUS ONCE
Status: COMPLETED | OUTPATIENT
Start: 2020-01-01 | End: 2020-01-01

## 2020-01-01 RX ORDER — ACETAMINOPHEN 160 MG/5ML
650 SOLUTION ORAL EVERY 4 HOURS PRN
Status: DISCONTINUED | OUTPATIENT
Start: 2020-01-01 | End: 2020-01-01 | Stop reason: HOSPADM

## 2020-01-01 RX ORDER — HEPARIN SODIUM 5000 [USP'U]/ML
5000 INJECTION, SOLUTION INTRAVENOUS; SUBCUTANEOUS EVERY 8 HOURS SCHEDULED
Status: DISCONTINUED | OUTPATIENT
Start: 2020-01-01 | End: 2020-01-01

## 2020-01-01 RX ORDER — VECURONIUM BROMIDE 1 MG/ML
INJECTION, POWDER, LYOPHILIZED, FOR SOLUTION INTRAVENOUS
Status: COMPLETED
Start: 2020-01-01 | End: 2020-01-01

## 2020-01-01 RX ORDER — BISACODYL 10 MG
10 SUPPOSITORY, RECTAL RECTAL DAILY PRN
Status: DISCONTINUED | OUTPATIENT
Start: 2020-01-01 | End: 2020-01-01

## 2020-01-01 RX ORDER — FENTANYL CITRATE 50 UG/ML
INJECTION, SOLUTION INTRAMUSCULAR; INTRAVENOUS
Status: COMPLETED
Start: 2020-01-01 | End: 2020-01-01

## 2020-01-01 RX ORDER — ONDANSETRON 4 MG/1
4 TABLET, FILM COATED ORAL EVERY 6 HOURS PRN
Status: DISCONTINUED | OUTPATIENT
Start: 2020-01-01 | End: 2020-01-01

## 2020-01-01 RX ORDER — ALUMINA, MAGNESIA, AND SIMETHICONE 2400; 2400; 240 MG/30ML; MG/30ML; MG/30ML
15 SUSPENSION ORAL EVERY 6 HOURS PRN
Status: DISCONTINUED | OUTPATIENT
Start: 2020-01-01 | End: 2020-01-01

## 2020-01-01 RX ORDER — SODIUM CHLORIDE 0.9 % (FLUSH) 0.9 %
10 SYRINGE (ML) INJECTION EVERY 12 HOURS SCHEDULED
Status: DISCONTINUED | OUTPATIENT
Start: 2020-01-01 | End: 2020-01-01

## 2020-01-01 RX ORDER — ALBUTEROL SULFATE 2.5 MG/3ML
2.5 SOLUTION RESPIRATORY (INHALATION) ONCE
Status: COMPLETED | OUTPATIENT
Start: 2020-01-01 | End: 2020-01-01

## 2020-01-01 RX ORDER — GLYCOPYRROLATE 0.2 MG/ML
0.4 INJECTION INTRAMUSCULAR; INTRAVENOUS
Status: DISCONTINUED | OUTPATIENT
Start: 2020-01-01 | End: 2020-01-01 | Stop reason: HOSPADM

## 2020-01-01 RX ORDER — DOPAMINE HYDROCHLORIDE 160 MG/100ML
2-20 INJECTION, SOLUTION INTRAVENOUS
Status: DISCONTINUED | OUTPATIENT
Start: 2020-01-01 | End: 2020-01-01 | Stop reason: HOSPADM

## 2020-01-01 RX ORDER — DOPAMINE HYDROCHLORIDE 160 MG/100ML
INJECTION, SOLUTION INTRAVENOUS
Status: COMPLETED
Start: 2020-01-01 | End: 2020-01-01

## 2020-01-01 RX ORDER — FENTANYL CITRATE 50 UG/ML
25 INJECTION, SOLUTION INTRAMUSCULAR; INTRAVENOUS
Status: DISCONTINUED | OUTPATIENT
Start: 2020-01-01 | End: 2020-01-01

## 2020-01-01 RX ORDER — CALCIUM CHLORIDE 100 MG/ML
INJECTION INTRAVENOUS; INTRAVENTRICULAR
Status: COMPLETED | OUTPATIENT
Start: 2020-01-01 | End: 2020-01-01

## 2020-01-01 RX ORDER — NOREPINEPHRINE BIT/0.9 % NACL 8 MG/250ML
INFUSION BOTTLE (ML) INTRAVENOUS
Status: COMPLETED
Start: 2020-01-01 | End: 2020-01-01

## 2020-01-01 RX ORDER — ACETAMINOPHEN 650 MG/1
650 SUPPOSITORY RECTAL EVERY 4 HOURS PRN
Status: DISCONTINUED | OUTPATIENT
Start: 2020-01-01 | End: 2020-01-01 | Stop reason: HOSPADM

## 2020-01-01 RX ORDER — CARBOXYMETHYLCELLULOSE SODIUM 10 MG/ML
1 GEL OPHTHALMIC
Status: DISCONTINUED | OUTPATIENT
Start: 2020-01-01 | End: 2020-01-01 | Stop reason: HOSPADM

## 2020-01-01 RX ORDER — PHENYLEPHRINE HCL IN 0.9% NACL 0.5 MG/5ML
.5-3 SYRINGE (ML) INTRAVENOUS
Status: DISCONTINUED | OUTPATIENT
Start: 2020-01-01 | End: 2020-01-01

## 2020-01-01 RX ORDER — ONDANSETRON 2 MG/ML
4 INJECTION INTRAMUSCULAR; INTRAVENOUS EVERY 6 HOURS PRN
Status: DISCONTINUED | OUTPATIENT
Start: 2020-01-01 | End: 2020-01-01

## 2020-01-01 RX ORDER — ETOMIDATE 2 MG/ML
INJECTION INTRAVENOUS
Status: COMPLETED
Start: 2020-01-01 | End: 2020-01-01

## 2020-01-01 RX ADMIN — SODIUM BICARBONATE 50 MEQ: 84 INJECTION, SOLUTION INTRAVENOUS at 21:08

## 2020-01-01 RX ADMIN — HEPARIN SODIUM 5000 UNITS: 5000 INJECTION INTRAVENOUS; SUBCUTANEOUS at 05:56

## 2020-01-01 RX ADMIN — CEFEPIME HYDROCHLORIDE 1 G: 1 INJECTION, POWDER, FOR SOLUTION INTRAMUSCULAR; INTRAVENOUS at 04:15

## 2020-01-01 RX ADMIN — DOPAMINE HYDROCHLORIDE 2 MCG/KG/MIN: 160 INJECTION, SOLUTION INTRAVENOUS at 17:56

## 2020-01-01 RX ADMIN — CALCIUM CHLORIDE 1 G: 100 INJECTION INTRAVENOUS; INTRAVENTRICULAR at 01:31

## 2020-01-01 RX ADMIN — Medication 10 ML: at 21:08

## 2020-01-01 RX ADMIN — SODIUM CHLORIDE 0.1 MCG/KG/MIN: 900 INJECTION, SOLUTION INTRAVENOUS at 07:37

## 2020-01-01 RX ADMIN — PHENYLEPHRINE HYDROCHLORIDE 6 MCG/KG/MIN: 10 INJECTION INTRAVENOUS at 18:54

## 2020-01-01 RX ADMIN — SODIUM CHLORIDE 1500 ML: 900 INJECTION, SOLUTION INTRAVENOUS at 05:47

## 2020-01-01 RX ADMIN — NOREPINEPHRINE BITARTRATE 0.5 MCG/KG/MIN: 1 INJECTION, SOLUTION, CONCENTRATE INTRAVENOUS at 04:34

## 2020-01-01 RX ADMIN — VECURONIUM BROMIDE 8 MG: 1 INJECTION, POWDER, LYOPHILIZED, FOR SOLUTION INTRAVENOUS at 13:15

## 2020-01-01 RX ADMIN — HYDROCORTISONE SODIUM SUCCINATE 100 MG: 100 INJECTION, POWDER, FOR SOLUTION INTRAMUSCULAR; INTRAVENOUS at 11:40

## 2020-01-01 RX ADMIN — SODIUM BICARBONATE 100 MEQ: 84 INJECTION, SOLUTION INTRAVENOUS at 02:15

## 2020-01-01 RX ADMIN — EPINEPHRINE 0.3 MCG/KG/MIN: 1 INJECTION INTRAMUSCULAR; INTRAVENOUS; SUBCUTANEOUS at 10:33

## 2020-01-01 RX ADMIN — ASPIRIN 81 MG 324 MG: 81 TABLET ORAL at 08:08

## 2020-01-01 RX ADMIN — PANTOPRAZOLE SODIUM 40 MG: 40 INJECTION, POWDER, FOR SOLUTION INTRAVENOUS at 05:56

## 2020-01-01 RX ADMIN — SODIUM CHLORIDE 2 MG/HR: 900 INJECTION, SOLUTION INTRAVENOUS at 15:49

## 2020-01-01 RX ADMIN — MIDAZOLAM HYDROCHLORIDE 2 MG: 1 INJECTION INTRAMUSCULAR; INTRAVENOUS at 13:11

## 2020-01-01 RX ADMIN — DOPAMINE HYDROCHLORIDE 20 MCG/KG/MIN: 160 INJECTION, SOLUTION INTRAVENOUS at 07:01

## 2020-01-01 RX ADMIN — EPINEPHRINE 0.2 MCG/KG/MIN: 1 INJECTION INTRAMUSCULAR; INTRAVENOUS; SUBCUTANEOUS at 18:54

## 2020-01-01 RX ADMIN — MIDAZOLAM HYDROCHLORIDE 2 MG: 1 INJECTION, SOLUTION INTRAMUSCULAR; INTRAVENOUS at 15:43

## 2020-01-01 RX ADMIN — Medication 10 ML: at 12:24

## 2020-01-01 RX ADMIN — HEPARIN SODIUM 5000 UNITS: 5000 INJECTION INTRAVENOUS; SUBCUTANEOUS at 21:23

## 2020-01-01 RX ADMIN — EPINEPHRINE 0.3 MCG/KG/MIN: 1 INJECTION INTRAMUSCULAR; INTRAVENOUS; SUBCUTANEOUS at 04:55

## 2020-01-01 RX ADMIN — SODIUM BICARBONATE 50 MEQ: 84 INJECTION, SOLUTION INTRAVENOUS at 19:19

## 2020-01-01 RX ADMIN — MIDAZOLAM HYDROCHLORIDE 2 MG: 1 INJECTION INTRAMUSCULAR; INTRAVENOUS at 15:43

## 2020-01-01 RX ADMIN — DOPAMINE HYDROCHLORIDE 20 MCG/KG/MIN: 160 INJECTION, SOLUTION INTRAVENOUS at 11:40

## 2020-01-01 RX ADMIN — Medication 50 MEQ: at 19:19

## 2020-01-01 RX ADMIN — Medication 10 ML: at 08:38

## 2020-01-01 RX ADMIN — HYDROCORTISONE SODIUM SUCCINATE 100 MG: 100 INJECTION, POWDER, FOR SOLUTION INTRAMUSCULAR; INTRAVENOUS at 04:16

## 2020-01-01 RX ADMIN — EPINEPHRINE 1 MG: 0.1 INJECTION, SOLUTION ENDOTRACHEAL; INTRACARDIAC; INTRAVENOUS at 01:31

## 2020-01-01 RX ADMIN — DOPAMINE HYDROCHLORIDE 20 MCG/KG/MIN: 160 INJECTION, SOLUTION INTRAVENOUS at 00:47

## 2020-01-01 RX ADMIN — SODIUM CHLORIDE 0.03 UNITS/MIN: 9 INJECTION, SOLUTION INTRAVENOUS at 22:17

## 2020-01-01 RX ADMIN — PHENYLEPHRINE HYDROCHLORIDE 1 MCG/KG/MIN: 10 INJECTION INTRAVENOUS at 15:51

## 2020-01-01 RX ADMIN — PHENYLEPHRINE HYDROCHLORIDE 6 MCG/KG/MIN: 10 INJECTION INTRAVENOUS at 00:38

## 2020-01-01 RX ADMIN — PHENYLEPHRINE HYDROCHLORIDE 6 MCG/KG/MIN: 10 INJECTION INTRAVENOUS at 06:36

## 2020-01-01 RX ADMIN — SODIUM CHLORIDE 0.03 UNITS/MIN: 9 INJECTION, SOLUTION INTRAVENOUS at 08:39

## 2020-01-01 RX ADMIN — FENTANYL CITRATE 25 MCG: 50 INJECTION INTRAMUSCULAR; INTRAVENOUS at 13:14

## 2020-01-01 RX ADMIN — NOREPINEPHRINE BITARTRATE 0.5 MCG/KG/MIN: 1 INJECTION, SOLUTION, CONCENTRATE INTRAVENOUS at 18:55

## 2020-01-01 RX ADMIN — SODIUM CHLORIDE 0.3 MCG/KG/MIN: 900 INJECTION, SOLUTION INTRAVENOUS at 13:49

## 2020-01-01 RX ADMIN — VANCOMYCIN HYDROCHLORIDE 750 MG: 750 INJECTION, POWDER, LYOPHILIZED, FOR SOLUTION INTRAVENOUS at 05:45

## 2020-01-01 RX ADMIN — VANCOMYCIN HYDROCHLORIDE 750 MG: 750 INJECTION, POWDER, LYOPHILIZED, FOR SOLUTION INTRAVENOUS at 08:37

## 2020-01-01 RX ADMIN — FENTANYL CITRATE 25 MCG: 50 INJECTION, SOLUTION INTRAMUSCULAR; INTRAVENOUS at 13:14

## 2020-01-01 RX ADMIN — SODIUM CHLORIDE 0.03 UNITS/MIN: 9 INJECTION, SOLUTION INTRAVENOUS at 13:43

## 2020-01-01 RX ADMIN — ALBUTEROL SULFATE 2.5 MG: 2.5 SOLUTION RESPIRATORY (INHALATION) at 06:56

## 2020-01-01 RX ADMIN — ALBUMIN HUMAN 25 G: 0.25 SOLUTION INTRAVENOUS at 18:54

## 2020-01-01 RX ADMIN — CEFEPIME HYDROCHLORIDE 2 G: 2 INJECTION, POWDER, FOR SOLUTION INTRAVENOUS at 05:47

## 2020-01-01 RX ADMIN — MIDAZOLAM 2 MG: 1 INJECTION INTRAMUSCULAR; INTRAVENOUS at 13:11

## 2020-01-01 RX ADMIN — SODIUM CHLORIDE 500 ML: 450 INJECTION, SOLUTION INTRAVENOUS at 22:35

## 2020-01-01 RX ADMIN — SODIUM CHLORIDE 1000 ML: 0.9 INJECTION, SOLUTION INTRAVENOUS at 13:15

## 2020-01-01 RX ADMIN — SODIUM CHLORIDE 1000 ML: 0.9 INJECTION, SOLUTION INTRAVENOUS at 17:13

## 2020-01-01 RX ADMIN — DEXTROSE MONOHYDRATE 150 MEQ: 5 INJECTION, SOLUTION INTRAVENOUS at 23:15

## 2020-01-01 RX ADMIN — PHENYLEPHRINE HYDROCHLORIDE 6 MCG/KG/MIN: 10 INJECTION INTRAVENOUS at 10:33

## 2020-01-01 RX ADMIN — BUMETANIDE 3 MG: 0.25 INJECTION INTRAMUSCULAR; INTRAVENOUS at 09:07

## 2020-01-01 RX ADMIN — SODIUM BICARBONATE 150 MEQ: 84 INJECTION, SOLUTION INTRAVENOUS at 06:36

## 2020-01-01 RX ADMIN — PHENYLEPHRINE HYDROCHLORIDE 6 MCG/KG/MIN: 10 INJECTION INTRAVENOUS at 05:35

## 2020-01-01 RX ADMIN — Medication 50 MEQ: at 21:07

## 2020-01-01 RX ADMIN — EPINEPHRINE 0.3 MCG/KG/MIN: 1 INJECTION INTRAMUSCULAR; INTRAVENOUS; SUBCUTANEOUS at 23:59

## 2020-01-01 RX ADMIN — SODIUM BICARBONATE 50 MEQ: 84 INJECTION, SOLUTION INTRAVENOUS at 21:07

## 2020-04-12 PROBLEM — J90 PLEURAL EFFUSION: Status: ACTIVE | Noted: 2020-01-01

## 2020-04-12 NOTE — ED NOTES
Titrated pt O2 setting down to 2L NC post central line procedure      Nadeen Carmona, RN  04/12/20 0866

## 2020-04-12 NOTE — ED NOTES
Placed pt on 4L NC for low O2 sat during central line placement      Nadeen Carmona, RN  04/12/20 0751

## 2020-04-12 NOTE — NURSING NOTE
Pt progressively more confused, attempting to climb out of bed. Thinks shes at home, yelling for daughter. o2 sats dropped to 78% and b/p low, please see VS flowsheet. O2 needs increased to 7L N/c from 2L. Georgina Day, NP notified. Will proceed with intubation at this time.

## 2020-04-12 NOTE — PROCEDURES
Endotracheal Intubation  Date: 4/12/2020  Time: 1:20 PM    Indication: Respiratory Failure with hypoxia and hypercapnia    A time-out was completed verifying correct patient, procedure, positioning.  The patient was placed in a flat position. Sedation was obtained using Versed and fentanyl.  Norcuron was utilized for chemical paralysis as the patient is a rule out for COVID. The patient was easily ventilated using an ambu bag.  A Najera 4 Blade was used and inserted into the oropharynx at which time there was a Grade 1 view of the vocal cords. A 7.5-Omani endotracheal tube was inserted and visualized going through the vocal cords. The stylette was removed. Colorimetric change was visualized on the CO2 meter. Breath sounds were heard in bilateral lung fields equally. There was no insufflation auscultated over the epigastrium.  The endotracheal tube was placed at 22 cm, measured at the lip.   A chest x-ray was ordered to verify endotrachealtube placement.     The patient tolerated the procedure well and there were no immediate complications.  The patient's top dentures were removed and given to the patient's nurse prior to intubation.  The lower teeth are in various stages of decay and were not traumatized during intubation.

## 2020-04-12 NOTE — ED NOTES
Patient states she does not feel safe at home.  EMS found her on the floor where the patient says she sleeps. She denies any physical threats. Patient stated she lives with her daughter and she feels like her daughter can not properly care for her. Patient was covered in dried stool and smelled of urine. Patient states that she has not had a shower in weeks. Her hair and body were not maintained. Call placed to APS for general concern about whether caregiver can safely care for patient. .     Kasandra Almendarez RN  04/12/20 0646

## 2020-04-12 NOTE — NURSING NOTE
Received from ER. Pt very disheveled looking, hair matted to head. Multiple areas of bruising throughout entire body. Pt is not a good historian. States that she cooks, does have a shower chair, but does not use it, does not use anything to help her walk, is continent of bowel and bladder. She states she smoke a ppd of [employer]. States she does drink alcohol, but unable to tell me what kind. States she takes no meds at home. No drug allergies. No problems with eating or drinking. Attempted to call daughter to verify findings, but did not answer and there was not a voicemail to leave a message. Only oriented to self and daughter and month, does not know why she is here.

## 2020-04-12 NOTE — H&P
PULMONARY/ CRITICAL CARE/ SLEEP MEDICINE ADMISSION H&P NOTE        Patient Name:  Natalie Najera    :  1940    Medical Record:  [id number]    PRIMARY CARE PHYSICIAN     Provider, No Known    [facility] [place]  Natalie Najera is a 79 y.o. female who presented to the emergency department for evaluation following a fall at home.  The following information has been obtained primarily from review of documentation and collaboration with the emergency department physician as patient is confused and a very poor historian.  EMS transported the patient this morning with complaint of weakness, shortness of air, poor appetite and reported hypoxemia.  The patient's daughter reportedly called EMS reporting those symptoms as well as the patient had fallen yesterday and had laid on the floor for 12 to 24 hours.  EMS reported that they found the patient lying in stool and urine.  During transport the patient was noted to have saturations in the 80s and was given 3 L O2 with improvement.  In the emergency department the patient was asked if she could remember falling and she reported that she was simply lying on the floor because she did not have a bed to sleep on.  She denies that she takes medications and has not seen a doctor in at least 10 years.  She is a 1 pack/day smoker for most of her life and reports that she sometimes drinks alcohol however is unable to quantify it.    In the emergency department the patient was noted to be hypotensive and received 30 mL/kg IV fluid resuscitation per sepsis protocol with improvement in her blood pressure.  Chest x-ray revealed pulmonary edema and effusions.  The patient was admitted to the intensive care unit for further evaluation and treatment of severe sepsis and hypoxic respiratory failure.    The patient is a poor historian however she denies recent fever, chills, nausea, vomiting, chest pain.  She does report that she has been weak and short of breath.    REVIEW OF SYSTEMS    as  above    MEDICAL HISTORY    No past medical history on file.     SURGICAL HISTORY    Past Surgical History:   Procedure Laterality Date   • APPENDECTOMY          FAMILY HISTORY    No family history on file.    SOCIAL HISTORY    Social History     Tobacco Use   • Smoking status: Current Every Day Smoker     Packs/day: 1.00   • Smokeless tobacco: Never Used   Substance Use Topics   • Alcohol use: Not on file       ALLERGIES    No Known Allergies    MEDICATIONS    Scheduled Meds:  [START ON 2020] cefepime 1 g Intravenous Q24H   heparin (porcine) 5,000 Units Subcutaneous Q8H   [START ON 2020] pantoprazole 40 mg Intravenous Q AM   Propofol      sodium chloride 10 mL Intravenous Q12H     Continuous Infusions:  HYDROmorphone 0.2-3 mg/hr    midazolam 1 mg/mL 100mL NS 1-10 mg/hr Last Rate: 2 mg/hr (20 1549)   norepinephrine 0.02-0.3 mcg/kg/min    Pharmacy to dose vancomycin     phenylephrine 0.5-3 mcg/kg/min Last Rate: 1 mcg/kg/min (20 1551)   propofol 5-50 mcg/kg/min    vasopressin 0.03 Units/min Last Rate: 0.03 Units/min (20 1343)     PRN Meds:.•  aluminum-magnesium hydroxide-simethicone  •  bisacodyl  •  fentaNYL citrate (PF)  •  magnesium hydroxide  •  ondansetron **OR** ondansetron  •  Pharmacy to dose vancomycin  •  sodium chloride  •  sodium chloride  •  vancomycin      PHYSICAL EXAM    tMax 24 hrs:  Temp (24hrs), Av.3 °F (36.3 °C), Min:96.7 °F (35.9 °C), Max:98.4 °F (36.9 °C)    Vitals Ranges:  Temp:  [96.7 °F (35.9 °C)-98.4 °F (36.9 °C)] 96.7 °F (35.9 °C)  Heart Rate:  [] 106  Resp:  [16-20] 16  BP: ()/(29-97) 83/49  FiO2 (%):  [80 %] 80 %  Intake and Output Last 3 Shifts:  I/O last 3 completed shifts:  In: 1600 [IV Piggyback:1600]  Out: -     Constitutional: Acutely and chronically ill-appearing.  Emaciated and cachectic  Eyes:  PERRL, conjunctivae normal   HENT:  Atraumatic, external ears normal, nose normal, oropharynx dry, no pharyngeal exudates. Neck- normal range of  motion, no tenderness, supple.  Bilateral JVD, right IJ central line  Respiratory: Slightly tachypneic, diminished bases, no crackles, occasional wheezing  Cardiovascular: Sinus tachycardia, no murmurs, no gallops, no rubs   GI:  Soft, nondistended, normal bowel sounds, nontender, no rebound, no guarding   : Deferred  Musculoskeletal: Pitting bilateral ankle edema, no tenderness, no deformities.  No clubbing or cyanosis  Integument: Poor turgor, no rash   Neurologic: Confused and anxious.  No lateralizing deficits  Psychiatric: Anxious    LABS    Reviewed  Microbiology Results (last 10 days)     Procedure Component Value - Date/Time    S. Pneumo Ag Urine or CSF - Urine, Urine, Catheter [[id number]]  (Normal) Collected:  04/12/20 1326    Lab Status:  Final result Specimen:  Urine, Catheter Updated:  04/12/20 1412     Strep Pneumo Ag Negative    Legionella Antigen, Urine - Urine, Urine, Catheter [[id number]]  (Normal) Collected:  04/12/20 1326    Lab Status:  Final result Specimen:  Urine, Catheter Updated:  04/12/20 1412     LEGIONELLA ANTIGEN, URINE Negative    MRSA Screen, PCR (Inpatient) - Swab, Nares [[id number]]  (Normal) Collected:  04/12/20 1225    Lab Status:  Final result Specimen:  Swab from Nares Updated:  04/12/20 1445     MRSA PCR No MRSA Detected    Respiratory Panel, PCR - Swab, Nasopharynx [[id number]]  (Normal) Collected:  04/12/20 0510    Lab Status:  Final result Specimen:  Swab from Nasopharynx Updated:  04/12/20 0700     ADENOVIRUS, PCR Not Detected     Coronavirus 229E Not Detected     Coronavirus HKU1 Not Detected     Coronavirus NL63 Not Detected     Coronavirus OC43 Not Detected     Human Metapneumovirus Not Detected     Human Rhinovirus/Enterovirus Not Detected     Influenza B PCR Not Detected     Parainfluenza Virus 1 Not Detected     Parainfluenza Virus 2 Not Detected     Parainfluenza Virus 3 Not Detected     Parainfluenza Virus 4 Not Detected     Bordetella pertussis pcr Not Detected      Influenza A H1 2009 PCR Not Detected     Chlamydophila pneumoniae PCR Not Detected     Mycoplasma pneumo by PCR Not Detected     Influenza A PCR Not Detected     Influenza A H3 Not Detected     Influenza A H1 Not Detected     RSV, PCR Not Detected    Narrative:       The coronavirus on the RVP is NOT COVID-19 and is NOT indicative of infection with COVID-19.     SARS-CoV-2, PCR (IN-HOUSE), NP Swab in [employer] - Swab, Nasopharynx [[id number]]  (Normal) Collected:  04/12/20 0510    Lab Status:  Final result Specimen:  Swab from Nasopharynx Updated:  04/12/20 1347     COVID19 Not Detected           IMAGING & OTHER STUDIES    Imaging Results (Last 72 Hours)     Procedure Component Value Units Date/Time    XR Abdomen KUB [[id number]] Collected:  04/12/20 1426     Updated:  04/12/20 1430    Narrative:       DATE OF EXAM:  4/12/2020 1:32 PM     PROCEDURE:  XR ABDOMEN KUB-     INDICATIONS:  NG tube placement.     COMPARISON:  Chest radiographs and CT chest 04/12/2020.     TECHNIQUE:   Single radiographic view of the abdomen was obtained.        FINDINGS:  Enteric tube terminating in the proximal stomach. Mildly air distended  stomach. Mild air distended small and large bowel loops with a mild  colonic stool burden. No evidence of pneumatosis. No concerning  calcifications. Partially imaged bilateral pleural effusions and left  greater than right bibasilar opacities. Incompletely evaluated  lumbosacral spondylosis. No acute osseous abnormality is identified.        Impression:       Enteric tube terminating in the proximal stomach.     Electronically Signed By-Vincenzo Ghosh On:4/12/2020 2:28 PM  This report was finalized on [id number] by  Vincenzo Ghosh, .    XR Chest 1 View [[id number]] Collected:  04/12/20 1424     Updated:  04/12/20 1428    Narrative:       DATE OF EXAM:  4/12/2020 1:33 PM     PROCEDURE:  XR CHEST 1 VW-     INDICATIONS:  Status post intubation. Rule out COVID.     COMPARISON:  Radiographs and CT  04/12/2020.     TECHNIQUE:   Single radiographic AP view of the chest was obtained.     FINDINGS:  The study is limited by lordotic patient positioning. Overlying  artifacts. New endotracheal tube terminating in the lower thoracic  trachea, approximately 1 cm above the gregg. New enteric tube  terminating in the proximal stomach. Stable right IJ central venous  catheter. Stable left greater than right moderate bilateral pleural  effusions with underlying bibasilar opacities. No pneumothorax. Stable  cardiomegaly, likely accentuated by technique. No acute osseous  abnormality is identified.        Impression:          1. Limited study demonstrating a new endotracheal tube terminating in  the lower thoracic trachea, approximately 1 cm above the gregg.  2. Stable left greater than right moderate pleural effusions with  underlying bibasilar atelectasis and/or pneumonia.  3. Stable cardiomegaly, likely accentuated by technique.     Electronically Signed By-Vincenzo Ghosh On:4/12/2020 2:26 PM  This report was finalized on [id number] by  Vincenzo Ghosh, .    XR Chest 1 View [[id number]] Collected:  04/12/20 0843     Updated:  04/12/20 0910    Narrative:       DATE OF EXAM:  4/12/2020 8:00 AM     PROCEDURE:  XR CHEST 1 VW-     INDICATIONS:  Status post central line placement. Shortness of breath.     COMPARISON:  Chest radiograph and CT chest 04/12/2020.     TECHNIQUE:   Single radiographic AP view of the chest was obtained.     FINDINGS:  The study is limited by lordotic patient positioning. Overlying  artifacts. New right IJ central venous catheter terminating in the SVC.  No evidence of pneumothorax status post catheter placement. Stable left  greater than right moderate bilateral pleural effusions with underlying  bibasilar atelectasis and/or pneumonia. Biapical pleural-parenchymal  scarring. Calcified remnants of prior granulomatous disease. Stable  enlargement of the cardiac silhouette, likely accentuated by  technique.  Calcified atherosclerotic disease in the thoracic aorta. Lower cervical  spinal fusion hardware. No acute osseous abnormality is identified.        Impression:          1. Limited study demonstrating a right IJ central venous catheter  terminating in the SVC. No evidence of pneumothorax status post catheter  placement.  2. Stable left greater than right moderate bilateral pleural effusions  with underlying atelectasis and/or pneumonia.  3. Stable cardiomegaly, likely accentuated by technique.     Electronically Signed By-Vincenzo Ghosh On:4/12/2020 8:45 AM  This report was finalized on [id number] by  Vincenzo Ghosh, .    CT Head Without Contrast [[id number]] Collected:  04/12/20 0426     Updated:  04/12/20 0629    Narrative:       EXAMINATION: CT HEAD WO CONTRAST    DATE: 4/12/2020 6:03 AM     HISTORY:  Patient Data: Female, 79 years of age.  Clinical Indication : Altered loc.      COMPARISON: None available.     TECHNIQUE: Thin section noncontrast axial images were obtained through the head. Coronal reformatted images were then created. CT dose lowering techniques including automated exposure control, adjustment for patient size, and/or use of iterative   reconstruction were used.     FINDINGS:    Image quality: Mildly degraded by motion artifact and streak artifacts related to lead wire overlying the head. Imaging was repeated through the most affected areas.    Ventricles: Mildly enlarged due to involutional changes.     Parenchyma: Mild to moderate periventricular white matter hypodensities are present, most likely related to mild/moderate small vessel ischemic disease.   No CT evidence of confluent lobar cortical infarct.   No mass effect or midline shift.     Intracranial Hemorrhage: None.     Bones/Extracranial soft tissues: No depressed calvarial fracture.     Visualized Sinuses/Mastoids: Clear.          Impression:          1.  No acute intracranial process detected.  2.  Volume loss and  mild/moderate microvascular ischemic changes.          Clem Hernandez MD  Neuroradiologist    Thank you for this referral.  This exam was interpreted by a fellowship trained neuroradiologist.       SLOT:  [id number]      Electronically signed by:  Clem Hernandez    4/12/2020 4:28 AM    CT Chest Without Contrast [[id number]] Collected:  04/12/20 0422     Updated:  04/12/20 0625    Narrative:       EXAMINATION: CT CHEST WO CONTRAST    DATE: 4/12/2020 6:06 AM    INDICATION:  Dyspnea, altered mental status. ;    COMPARISON:  None.    PROCEDURE: Multiple axial CT images were obtained of the chest without IV contrast. Coronal and sagittal reformations were obtained.  CT dose lowering techniques were used, to include: automated exposure control, adjustment for patient size, and or use   of iterative reconstruction.    FINDINGS:    Cardiovascular: No aortic aneurysm is seen on this noncontrast exam.  No pericardial effusion.  Severe atherosclerotic calcifications of the thoracic aorta and branches including coronary arteries. Moderate cardiomegaly, especially left ventricle.    Mediastinum/Sanjuanita: No significant mediastinal or hilar adenopathy is seen.    Lungs/Pleura: Moderate bilateral pleural effusions. Minimal intralobular septal thickening. Minimal emphysematous changes lungs. Probable dependent atelectatic changes. Calcified granuloma left lower lobe.    Chest wall and Axilla: Unremarkable.    Bones:  No acute osseous abnormality. Minimal compression fracture deformity T11 vertebral body, chronic appearing.    Upper abdomen: Calcified granulomata liver and spleen.        Impression:         1.  Moderate bilateral pleural effusions. Minimal pulmonary interstitial edema. Moderate left ventricular enlargement. Severe atherosclerosis including coronary arteries.  2.  Minimal pulmonary emphysema.            Electronically signed by:  Rhonda Zuleta M.D.    4/12/2020 4:24 AM    XR Chest AP [[id number]] Collected:  04/12/20 0558      Updated:  04/12/20 0602    Narrative:       DATE OF EXAM:  4/12/2020 5:15 AM     PROCEDURE:  XR CHEST AP-     INDICATIONS:  COVID-19 Evaluation, Cough, Fever; shortness of breath/dyspnea.     COMPARISON:  No Comparisons Available     TECHNIQUE:   A radiologic portable AP view of the chest was obtained.     FINDINGS:  A single AP upright portable chest radiograph reveals bilateral  atelectasis and/or infiltrate, greatest in the left lung base. The  findings may represent pulmonary edema. Infectious multifocal pneumonia  cannot be excluded. There may be small bilateral pleural effusions.  There is mild to moderate cardiomegaly. No pneumothorax is seen. The  thoracic aorta is atherosclerotic. There are postoperative changes of  the cervical spine. Chronic calcified granulomatous disease of the chest  is suspected.           Impression:       Bilateral infiltrates are seen with prominence of the pulmonary  vasculature. The findings may represent mild to moderate pulmonary edema  with vascular congestion. Infectious multifocal pneumonia cannot be  excluded. The greatest degree of pulmonary consolidation is in the left  lung base.      There is mild to moderate cardiomegaly.      Small bilateral pleural effusions may be present.      No pneumothorax is seen.     Electronically Signed By-Dr. Alex Gerard MD On:4/12/2020 6:00 AM  This report was finalized on [id number] by Dr. Alex Gerard MD.          ASSESSMENT    Severe sepsis, multifactorial with UTI and probable community-acquired pneumonia  -Patient received 30 mL/kg IV fluid resuscitation in the emergency department  -SARS-COVID swab pending  -Urine antigens for Legionella and strep pneumo negative  -Blood cultures pending  -Sputum culture pending  -Urinalysis reflexed for culture, results pending  -  -Lactate 2.2  -C-reactive protein 5.11  -Procalcitonin 0.25  -Empiric antibiotics with cefepime and vancomycin    Acute hypoxic/hypercapnic respiratory  failure  -O2 supplementation as needed to maintain a saturation of 89 to 91%  -Multifactorial with probable acute exacerbation COPD, community-acquired pneumonia, fluid volume overload with pleural effusions  -Respiratory viral panel negative  -proBNP> 70,000  -Echocardiogram pending  -Diurese as tolerated by blood pressure    Elevated troponin  -Multifactorial with demand ischemia, LEIGHTON, age  -Continue to trend    LEIGHTON, multifactorial with UTI, sepsis  -Has received IV fluid resuscitation  -Monitor and trend    Mild rhabdomyolysis secondary to fall at home  -  -Urine and serum myoglobin pending  -Patient received IV fluid resuscitation, serial monitoring    Protein malnutrition  -Consult the dietitian for supplement recommendations      PUD prophylaxis with Protonix  DVT prophylaxis Heparin subcu      PLAN    See orders. The plan was discussed with the patient    THIS DOCUMENT HAS BEEN ELECTRONICALLY SIGNED BY  JOÃO Gomez  3:53 PM    Attending physician statement:  High risk/complexity patient.   Above note scribed by nurse practitioner for me and later reviewed for accuracy. I've examined the patient and reviewed all labs and images.   I have directly participated in the evaluation and management of this patient.  Joey Barlow MD  Pulmonary and CCM  [facility]

## 2020-04-12 NOTE — PROGRESS NOTES
"Pharmacy Antimicrobial Dosing Service    Subjective:  Natalie Najera is a 79 y.o.female admitted with principle problem of pleural effusion. Pharmacy has been consulted to dose Vancomycin for possible sepsis.      Assessment/Plan    1. Day #1 Vancomycin: Pulse dosing d/t renal dysfxn. Due to elevated SCr, will initiate pulse dosing. Patient received vancomycin 750 mg (15 mg/kg ABW) this AM. Will obtain random with AM labs tomorrow and plan to redose if < 20 mcg/mL.    2. Day #1 Cefepime: 1 gm IV q24h for estCrCl < 30 mL/min and ABW 51 kg.    Will continue to monitor drug levels, renal function, culture and sensitivities, and patient clinical status.       Objective:  Relevant clinical data and objective history reviewed:  160 cm (63\")   51.2 kg (112 lb 14 oz)   Ideal body weight: 52.4 kg (115 lb 8.3 oz)  Body mass index is 20 kg/m².        Results from last 7 days   Lab Units 04/12/20  0511   CREATININE mg/dL 1.69*     Estimated Creatinine Clearance: 21.8 mL/min (A) (by C-G formula based on SCr of 1.69 mg/dL (H)).  I/O last 3 completed shifts:  In: 1600 [IV Piggyback:1600]  Out: -     Results from last 7 days   Lab Units 04/12/20  0545   WBC 10*3/mm3 9.30     Temperature    04/12/20 0455 04/12/20 0702 04/12/20 0947   Temp: 98.4 °F (36.9 °C) 96.7 °F (35.9 °C) 96.7 °F (35.9 °C)     Baseline culture/source/susceptibility:  Microbiology Results (last 10 days)       Procedure Component Value - Date/Time    Respiratory Panel, PCR - Swab, Nasopharynx [[id number]]  (Normal) Collected:  04/12/20 0510    Lab Status:  Final result Specimen:  Swab from Nasopharynx Updated:  04/12/20 0700     ADENOVIRUS, PCR Not Detected     Coronavirus 229E Not Detected     Coronavirus HKU1 Not Detected     Coronavirus NL63 Not Detected     Coronavirus OC43 Not Detected     Human Metapneumovirus Not Detected     Human Rhinovirus/Enterovirus Not Detected     Influenza B PCR Not Detected     Parainfluenza Virus 1 Not Detected     Parainfluenza " Virus 2 Not Detected     Parainfluenza Virus 3 Not Detected     Parainfluenza Virus 4 Not Detected     Bordetella pertussis pcr Not Detected     Influenza A H1 2009 PCR Not Detected     Chlamydophila pneumoniae PCR Not Detected     Mycoplasma pneumo by PCR Not Detected     Influenza A PCR Not Detected     Influenza A H3 Not Detected     Influenza A H1 Not Detected     RSV, PCR Not Detected    Narrative:       The coronavirus on the RVP is NOT COVID-19 and is NOT indicative of infection with COVID-19.              Anti-Infectives (From admission, onward)      Ordered     Dose/Rate Route Frequency Start Stop    04/12/20 0936  Pharmacy to dose vancomycin  Review   Ordering Provider:  Georgina Estrada APRN     Does not apply Continuous PRN 04/12/20 0935 04/19/20 0934    04/12/20 0503  vancomycin 750 mg in sodium chloride 0.9 % 100 mL IVPB     Ordering Provider:  Jimi Sosa MD    750 mg Intravenous Once 04/12/20 0530 04/12/20 0657    04/12/20 0503  ceFEPime (MAXIPIME) in SWFI 2g/10ml IV PUSH syringe     Ordering Provider:  Jimi Sosa MD    2 g  over 5 Minutes Intravenous Once 04/12/20 0505 04/12/20 0552            Juana Stewart, PharmD  04/12/20 11:26

## 2020-04-12 NOTE — ED NOTES
Respiratory therapy are giving a nebulizer treatment at this time unable to enter room in 1 hour.      Kasandra Almendarez RN  04/12/20 0660

## 2020-04-12 NOTE — ED NOTES
I called [employer] and notified them that an in-house Covid has been added on     Nica Mo, Annieed Rep  04/12/20 3667

## 2020-04-12 NOTE — ATTESTATION SEPSIS FOCUSED EXAM
SEPTIC SHOCK FOCUSED EXAM ATTESTATION    I attest that I have reassessed tissue perfusion after the fluid bolus given.    Georgina  Day, APRN 04/12/20  16:11

## 2020-04-12 NOTE — ED PROVIDER NOTES
Subjective   79-year-old female transported by EMS for weakness, shortness of air, hypoxemia.  Patient has no complaints and acts as if nothing is wrong.  Daughter called EMS for the previous mentioned symptoms.  Daughter told medics that patient had fallen yesterday and had laid on the floor throughout the day.  Daughter had attempted get the patient to eat but she took little p.o. intake.  I asked the patient if she remembered falling and she told me she did not fall that she was simply laying on the floor because she did not have a bed to sleep on.  EMS noted patient had been coughing intermittently in route and was hypoxemic in the low 80s.  Patient was given supplemental oxygen 3 L which improved oxygen saturations.  Patient apparently has not seen a doctor in over 10 years and has no established past medical history.  Patient tells me she on occasion drinks alcohol but not to excess.  Patient is a smoker.          Review of Systems   Constitutional: Positive for fatigue.   Respiratory: Positive for cough and shortness of breath.    All other systems reviewed and are negative.      No past medical history on file.    No Known Allergies    No past surgical history on file.    No family history on file.    Social History     Socioeconomic History   • Marital status: Single     Spouse name: Not on file   • Number of children: Not on file   • Years of education: Not on file   • Highest education level: Not on file           Objective   Physical Exam   Constitutional: She is oriented to person, place, and time.   Thin elderly female in no acute distress   HENT:   Head: Normocephalic and atraumatic.   Mouth/Throat: Oropharynx is clear and moist.   Eyes: Pupils are equal, round, and reactive to light. Conjunctivae are normal.   Neck: Normal range of motion. Neck supple.   Cervical spine nontender, full range of motion without pain   Cardiovascular:   Tachycardic, no murmur   Pulmonary/Chest: Effort normal.   Wheezes  and rhonchi bilaterally   Abdominal: Soft. Bowel sounds are normal. She exhibits no distension. There is no tenderness.   Musculoskeletal:   Trace pedal edema with mild erythema to the lower extremities, no bony tenderness to palpation, no pain, thoracic and lumbar spines nontender   Neurological: She is alert and oriented to person, place, and time. No cranial nerve deficit.   Motor and sensation intact   Skin: Skin is warm and dry.   Psychiatric:   Irritable, otherwise appropriate       Central Line At Bedside  Date/Time: 4/12/2020 7:49 AM  Performed by: Jimi Sosa MD  Authorized by: Jimi Sosa MD     Consent:     Consent obtained:  Verbal    Consent given by:  Patient    Risks discussed:  Arterial puncture, infection, pneumothorax, incorrect placement and bleeding  Pre-procedure details:     Hand hygiene: Hand hygiene performed prior to insertion      Sterile barrier technique: All elements of maximal sterile technique followed      Skin preparation:  2% chlorhexidine    Skin preparation agent: Skin preparation agent completely dried prior to procedure    Anesthesia (see MAR for exact dosages):     Anesthesia method:  Local infiltration    Local anesthetic:  Lidocaine 1% w/o epi  Procedure details:     Location:  R internal jugular    Catheter size:  7 Fr    Landmarks identified: yes      Ultrasound guidance: yes      Sterile ultrasound techniques: Sterile gel and sterile probe covers were used      Number of attempts:  1    Successful placement: yes    Post-procedure details:     Post-procedure:  Dressing applied    Assessment:  Blood return through all ports and free fluid flow    Patient tolerance of procedure:  Tolerated well, no immediate complications               ED Course  ED Course as of Apr 12 0751   Sun Apr 12, 2020   0510 EKG interpretation: Sinus tachycardia, rate 101, right bundle branch block, no STEMI seen    [JR]      ED Course User Index  [JR] Jimi Sosa MD                                            MDM  Number of Diagnoses or Management Options  Acute UTI:   Pleural effusion:   Septic shock (CMS/HCC):   Diagnosis management comments: Results for orders placed or performed during the hospital encounter of 04/12/20  -Comprehensive Metabolic Panel       Result                      Value             Ref Range           Glucose                     81                65 - 99 mg/dL       BUN                         37 (H)            8 - 23 mg/dL        Creatinine                  1.69 (H)          0.57 - 1.00 *       Sodium                      144               136 - 145 mm*       Potassium                   5.1               3.5 - 5.2 mm*       Chloride                    103               98 - 107 mmo*       CO2                         27.0              22.0 - 29.0 *       Calcium                     9.0               8.6 - 10.5 m*       Total Protein               5.9 (L)           6.0 - 8.5 g/*       Albumin                     2.90 (L)          3.50 - 5.20 *       ALT (SGPT)                  23                1 - 33 U/L          AST (SGOT)                  39 (H)            1 - 32 U/L          Alkaline Phosphatase        61                39 - 117 U/L        Total Bilirubin             0.9               0.2 - 1.2 mg*       eGFR Non  Amer       29 (L)            >60 mL/min/1*       Globulin                    3.0               gm/dL               A/G Ratio                   1.0               g/dL                BUN/Creatinine Ratio        21.9              7.0 - 25.0          Anion Gap                   14.0              5.0 - 15.0 m*  -Lactate Dehydrogenase       Result                      Value             Ref Range           LDH                         330 (H)           135 - 214 U/L  -Procalcitonin       Result                      Value             Ref Range           Procalcitonin               0.25              0.10 - 0.25 *  -C-reactive Protein       Result                       Value             Ref Range           C-Reactive Protein          5.11 (H)          0.00 - 0.50 *  -Troponin       Result                      Value             Ref Range           Troponin T                  0.105 (C)         0.000 - 0.03*  -BNP       Result                      Value             Ref Range           proBNP                      >70,000.0 (H)     5.0-1,800.0 *  -Ethanol       Result                      Value             Ref Range           Ethanol %                   <0.010            %              -Urine Drug Screen - Urine, Catheter       Result                      Value             Ref Range           Amphet/Methamphet, Scr*     Negative          Negative            Barbiturates Screen, U*     Negative          Negative            Benzodiazepine Screen,*     Negative          Negative            Cocaine Screen, Urine       Negative          Negative            Opiate Screen               Negative          Negative            THC, Screen, Urine          Negative          Negative            Methadone Screen, Urine     Negative          Negative            Oxycodone Screen, Urine     Negative          Negative       -CK       Result                      Value             Ref Range           Creatine Kinase             195 (H)           20 - 180 U/L   -TSH       Result                      Value             Ref Range           TSH                         3.760             0.270 - 4.20*  -CBC Auto Differential       Result                      Value             Ref Range           WBC                         9.30              3.40 - 10.80*       RBC                         3.84              3.77 - 5.28 *       Hemoglobin                  13.2              12.0 - 15.9 *       Hematocrit                  38.9              34.0 - 46.6 %       MCV                         101.4 (H)         79.0 - 97.0 *       MCH                         34.4 (H)          26.6 - 33.0 *       MCHC                         33.9              31.5 - 35.7 *       RDW                         17.7 (H)          12.3 - 15.4 %       RDW-SD                      63.0 (H)          37.0 - 54.0 *       MPV                         10.2              6.0 - 12.0 fL       Platelets                   152               140 - 450 10*       Neutrophil %                87.8 (H)          42.7 - 76.0 %       Lymphocyte %                5.4 (L)           19.6 - 45.3 %       Monocyte %                  6.5               5.0 - 12.0 %        Eosinophil %                0.1 (L)           0.3 - 6.2 %         Basophil %                  0.2               0.0 - 1.5 %         Neutrophils, Absolute       8.10 (H)          1.70 - 7.00 *       Lymphocytes, Absolute       0.50 (L)          0.70 - 3.10 *       Monocytes, Absolute         0.60              0.10 - 0.90 *       Eosinophils, Absolute       0.00              0.00 - 0.40 *       Basophils, Absolute         0.00              0.00 - 0.20 *       nRBC                        0.2               0.0 - 0.2 /1*  -Urinalysis With Culture If Indicated - Urine, Catheter In/Out       Result                      Value             Ref Range           Color, UA                   Zahida (A)         Yellow, Straw       Appearance, UA              Cloudy (A)        Clear               pH, UA                      5.5               5.0 - 8.0           Specific [place], UA        1.021             1.005 - 1.030       Glucose, UA                 Negative          Negative            Ketones, UA                                   Negative        15 mg/dL (1+) (A)       Bilirubin, UA                                 Negative        Large (3+) (A)       Blood, UA                                     Negative        Large (3+) (A)       Protein, UA                                   Negative        100 mg/dL (2+) (A)       Leuk Esterase, UA                             Negative        Large (3+) (A)       Nitrite, UA                 Positive  (A)      Negative            Urobilinogen, UA                              0.2 - 1.0 E.*   2.0 E.U./dL (A)  -Protime-INR       Result                      Value             Ref Range           Protime                     11.3              9.6 - 11.7 S*       INR                         1.09              0.90 - 1.10    -Magnesium       Result                      Value             Ref Range           Magnesium                   1.8               1.6 - 2.4 mg*  -Phosphorus       Result                      Value             Ref Range           Phosphorus                  5.6 (H)           2.5 - 4.5 mg*  -Calcium, Ionized       Result                      Value             Ref Range           Ionized Calcium             1.22              1.20 - 1.30 *  -aPTT       Result                      Value             Ref Range           PTT                         22.0 (L)          24.0 - 31.0 *  -Urinalysis, Microscopic Only - Urine, Catheter In/Out       Result                      Value             Ref Range           RBC, UA                                       None Seen /H*   Too Numerous to Count (A)       WBC, UA                                       None Seen /H*   Too Numerous to Count (A)       Bacteria, UA                                  None Seen /H*   Unable to determine due to loaded field (A)       Squamous Epithelial Ce*     7-12 (A)          None Seen, 0*       Hyaline Casts, UA                             None Seen /L*   Unable to determine due to loaded field       Methodology                                                   Manual Light Microscopy  -POC Lactate       Result                      Value             Ref Range           Lactate                     1.7               0.5 - 2.0 mm*  -Light Blue Top       Result                      Value             Ref Range           Extra Tube                                                    hold for add-on  -Green Top (Gel)       Result                       Value             Ref Range           Extra Tube                                                    Hold for add-ons.  Ct Head Without Contrast    Result Date: 4/12/2020   1.  No acute intracranial process detected. 2.  Volume loss and mild/moderate microvascular ischemic changes.   Clem Hernandez MD Neuroradiologist Thank you for this referral.  This exam was interpreted by a fellowship trained neuroradiologist.   SLOT:  [id number]  Electronically signed by:  Clem Hernandez  4/12/2020 4:28 AM    Ct Chest Without Contrast    Result Date: 4/12/2020  1.  Moderate bilateral pleural effusions. Minimal pulmonary interstitial edema. Moderate left ventricular enlargement. Severe atherosclerosis including coronary arteries. 2.  Minimal pulmonary emphysema. Electronically signed by:  Rhonda Zuleta M.D.  4/12/2020 4:24 AM    Xr Chest Ap    Result Date: 4/12/2020  Bilateral infiltrates are seen with prominence of the pulmonary vasculature. The findings may represent mild to moderate pulmonary edema with vascular congestion. Infectious multifocal pneumonia cannot be excluded. The greatest degree of pulmonary consolidation is in the left lung base.  There is mild to moderate cardiomegaly.  Small bilateral pleural effusions may be present.  No pneumothorax is seen.  Electronically Signed By-Dr. Alex Gerard MD On:4/12/2020 6:00 AM This report was finalized on [id number] by Dr. Alex Gerard MD.      Patient is denied any chest pain or shortness of air consistently in the emergency department.  Cardiomegaly with pleural effusions, possible UTI present, corona virus testing will be initiated.  Status post 30 cc/kg bolus for hypotension, patient blood pressure 70/30.  Patient verbally consents central line placement.  Only VBG could be obtained, patient with respiratory acidosis.  Case discussed with Don with intensivist service, will see and address respiratory acidosis at this time patient is alert and oriented in no distress  and oxygenating well.  Patient will be ruled out for coronavirus though this seems unlikely at this point in her evaluation.       Amount and/or Complexity of Data Reviewed  Clinical lab tests: reviewed  Tests in the radiology section of CPT®: reviewed  Tests in the medicine section of CPT®: reviewed    Risk of Complications, Morbidity, and/or Mortality  Presenting problems: high  Diagnostic procedures: high  Management options: high    Critical Care  Total time providing critical care: 30-74 minutes    Patient Progress  Patient progress: stable      Final diagnoses:   Pleural effusion   Acute UTI   Septic shock (CMS/[facility])            Jimi Sosa MD  04/12/20 0751

## 2020-04-12 NOTE — ED NOTES
Replaced patients NC after pt removed      Nadeen Carmona, VERNON  04/12/20 0922       Nadeen Carmona RN  04/12/20 0922

## 2020-04-13 NOTE — PROGRESS NOTES
Continued Stay Note  [facility]     Patient Name: Natalie Najera  MRN: [id number]  Today's Date: 4/13/2020    Admit Date: 4/12/2020    Discharge Plan     Row Name 04/13/20 1125       Plan    Plan  Unable to case manage at this time. SW consulted to assist in finding family.     Plan Comments  Barriers to discharge: on 4 pressers at this time. Ventilator support. [facility]AD[facility] consulted to assist in finding family.             Anna Naegele RN [place] [address]   [phone]  office  [phone]  fax  [email or web address]

## 2020-04-13 NOTE — CODE DOCUMENTATION
Initial Rhythm:  PEA  Description of Code:  I responded to overhead code 4 page. CPR was in progress and conducted as per ACLS protocol.  See code sheet for details of code.  Intubation: Not required, already intubated  Result of Code: ROSC  Time of code:  01:30  Time of ROSC: 01:32   Labs ordered: ABG, CBC, CMP, CXR

## 2020-04-13 NOTE — DISCHARGE SUMMARY
Date of Death:  4/13/2020  Time of Death:  1547  Cause of Death: Multi-system Organ failure, Septic Shock, UTI, Community Acquired PNA, Acute hypoxic/hypercapnic respiratory failure further complicated by bilateral pleural effusions, COPD    Date of Admission: 4/12/2020  4:45 AM  Social History     Tobacco Use   • Smoking status: Current Every Day Smoker     Packs/day: 1.00   • Smokeless tobacco: Never Used   Substance Use Topics   • Alcohol use: Not on file   • Drug use: Not Currently           Presenting Problem/History of Present Illness  Pleural effusion [J90]  Acute UTI [N39.0]  Septic shock (CMS/Formerly [facility]) [A41.9, R65.21]      Hospital Course  Information obtained and some copied directly from EMR  Admit:  4/12/20    Per H&P:  Natalie Najera is a 79 y.o. female who presented to the emergency department for evaluation following a fall at home.  The following information has been obtained primarily from review of documentation and collaboration with the emergency department physician as patient is confused and a very poor historian.  EMS transported the patient this morning with complaint of weakness, shortness of air, poor appetite and reported hypoxemia.  The patient's daughter reportedly called EMS reporting those symptoms as well as the patient had fallen yesterday and had laid on the floor for 12 to 24 hours.  EMS reported that they found the patient lying in stool and urine.  During transport the patient was noted to have saturations in the 80s and was given 3 L O2 with improvement.  In the emergency department the patient was asked if she could remember falling and she reported that she was simply lying on the floor because she did not have a bed to sleep on.  She denies that she takes medications and has not seen a doctor in at least 10 years.  She is a 1 pack/day smoker for most of her life and reports that she sometimes drinks alcohol however is unable to quantify it.     In the emergency department the  patient was noted to be hypotensive and received 30 mL/kg IV fluid resuscitation per sepsis protocol with improvement in her blood pressure.  Chest x-ray revealed pulmonary edema and effusions.  The patient was admitted to the intensive care unit for further evaluation and treatment of severe sepsis and hypoxic respiratory failure.     The patient is a poor historian however she denies recent fever, chills, nausea, vomiting, chest pain.  She does report that she has been weak and short of breath.      4/13/20:  Code 4 was called overnight for a rhythm of PEA.  ROSC was achieved. Multiple pressors, HCO3 drip, and intubated.  Some bright red blood reported in stool per nursing    Severe sepsis/Shock, multifactorial with UTI and probable community-acquired pneumonia  -Patient received 30 mL/kg IV fluid resuscitation in the emergency department  -SARS-COVID swab NEGATIVE  -Urine antigens for Legionella and strep pneumo negative  -Blood cultures neg to date  -Sputum culture pending  -UA reviewed   -  -Lactate 2.2  -C-reactive protein 5.11  -Procalcitonin 0.25  -Cont cefepime and vancomycin     Acute hypoxic/hypercapnic respiratory failure  -Intubated, vent reviewed.  ABG reviewed   -Multifactorial with probable acute exacerbation COPD, community-acquired pneumonia, fluid volume overload with pleural effusions  -Respiratory viral panel negative  -proBNP> 70,000  -Echocardiogram pending  -Bumex 3 mg IV x 1, check CVP and mixed gas  -currently off sedation     Bilateral pleural effusions   -CXR and CT chest reviewed  -Bumex 3 mg IV x 1 today     Elevated troponin  -Multifactorial with demand ischemia, LEIGHTON, age  -ECHO pending  -monitor     LEIGHTON, multifactorial with UTI, sepsis  -Has received IV fluid resuscitation  -creatinine 2.14  -Monitor and trend     Mild rhabdomyolysis secondary to fall at home  -  -Urine and serum myoglobin pending  -Patient received IV fluid resuscitation     Protein  malnutrition  -Consulted dietitian         PUD prophylaxis with Protonix  DVT prophylaxis Heparin subcu        Social work consult to assist in locating family.  Patient would not be a good candidate for HD treatment as she is on multiple pressors and blood pressure would likely not tolerate.     4/13/20:  I (JOÃO Townsend) spoke to the patients two daughters Stella and Valarie about their mothers current condition.  At this time they both expressed wishes to change their mothers code status to comfort measures only.  They both understood and were in agreement for terminal extubation and to stop all current treatment and life sustaining measures.        Procedures Performed:    04/12 2002 Insert Arterial Line  04/12 1322 Note By: Georgina Estrada APRN    Consults:   Consults     Date and Time Order Name Status Description    4/13/2020 0619 Inpatient Nephrology Consult            Labs:    Lab Results (last 24 hours)     Procedure Component Value Units Date/Time    Basic Metabolic Panel [[id number]]  (Abnormal) Collected:  04/13/20 1148    Specimen:  Blood Updated:  04/13/20 1217     Glucose 207 mg/dL      BUN 42 mg/dL      Creatinine 2.20 mg/dL      Sodium 143 mmol/L      Potassium 5.6 mmol/L      Chloride 101 mmol/L      CO2 13.0 mmol/L      Calcium 7.5 mg/dL      eGFR Non African Amer 22 mL/min/1.73      BUN/Creatinine Ratio 19.1     Anion Gap 29.0 mmol/L     Narrative:       GFR Normal >60  Chronic Kidney Disease <60  Kidney Failure <15      Urine Culture - Urine, Urine, Catheter In/Out [[id number]]  (Abnormal) Collected:  04/12/20 0525    Specimen:  Urine, Catheter In/Out Updated:  04/13/20 1145     Urine Culture >100,000 CFU/mL Proteus species    Myoglobin, Serum [[id number]]  (Abnormal) Collected:  04/12/20 1224    Specimen:  Blood Updated:  04/13/20 1144     Myoglobin 370.2 ng/mL     Narrative:       Results may be falsely decreased if patient taking Biotin.      Ferritin [[id number]]  (Abnormal) Collected:  04/13/20  0426    Specimen:  Blood Updated:  04/13/20 1119     Ferritin 2,603.00 ng/mL     Narrative:       Results may be falsely decreased if patient taking Biotin.      Lactate Dehydrogenase [[id number]]  (Abnormal) Collected:  04/13/20 0427    Specimen:  Blood Updated:  04/13/20 1042      U/L     C-reactive Protein [[id number]]  (Abnormal) Collected:  04/13/20 0427    Specimen:  Blood Updated:  04/13/20 1039     C-Reactive Protein 3.24 mg/dL     Myoglobin Screen, Urine - Urine, Catheter [[id number]]  (Abnormal) Collected:  04/12/20 1326    Specimen:  Urine, Catheter Updated:  04/13/20 1039     Myoglobin, Qualitative Positive    Narrative:       Due to the similarities in the chemical properties of Hemoglobin and and Myoglobin, the presence of either will yield a positive Myoglobin Screen.    Respiratory Culture - Sputum, Cough [[id number]] Collected:  04/13/20 0823    Specimen:  Sputum from Cough Updated:  04/13/20 0958     Gram Stain Moderate (3+) WBCs per low power field      No organisms seen    Blood Gas, Mixed [[id number]]  (Abnormal) Collected:  04/13/20 0845    Specimen:  Blood Updated:  04/13/20 0917     pH, mixed 7.19 pH units      PCO2 MIXED 47.4 mmHg      PO2 MIXED 38.1 mmHg      HCO3 MIXED 18.3 mmol/L      CO2 Content 19.7 mmol/L      Base Excess, Arterial -9.8 mmol/L      Comment: Serial Number: [id number]:  [id number]        O2 SATURATION MIXED 59.5 %      Hemodilution No     Site CentralLine     Inderjit's Test N/A     Modality Adult Vent     FIO2 70 %      Set Mech Resp Rate 34     PEEP 10     Base Deficit -9.9 mEq/liter      Ventilator Mode ;AC    Sedimentation Rate [[id number]]  (Normal) Collected:  04/13/20 0426    Specimen:  Blood Updated:  04/13/20 0916     Sed Rate 10 mm/hr     Occult Blood, Fecal By Immunoassay - Stool, Per Rectum [[id number]]  (Abnormal) Collected:  04/13/20 0822    Specimen:  Stool from Per Rectum Updated:  04/13/20 0913     Occult Blood, Fecal by Immunoassay Positive    Blood Gas,  Arterial [[id number]]  (Abnormal) Collected:  04/13/20 0835    Specimen:  Arterial Blood Updated:  04/13/20 0840     Site Arterial Line     Inderjit's Test N/A     pH, Arterial 7.220 pH units      pCO2, Arterial 40.8 mm Hg      pO2, Arterial 124.4 mm Hg      HCO3, Arterial 16.7 mmol/L      Base Excess, Arterial -10.5 mmol/L      Comment: Serial Number: [id number]:  [id number]        O2 Saturation, Arterial 98.0 %      CO2 Content 18.0 mmol/L      Barometric Pressure for Blood Gas --     Comment: N/A        Modality Adult Vent     FIO2 100 %      Ventilator Mode ;AC     Set Tidal Volume 400     PEEP 10     Hemodilution No     Respiratory Rate 34    Blood Culture - Blood, Arm, Right [[id number]]  (Abnormal) Collected:  04/12/20 0511    Specimen:  Blood from Arm, Right Updated:  04/13/20 0825     Blood Culture Abnormal Stain     Gram Stain Aerobic Bottle Gram positive cocci in clusters    Blood Culture ID, PCR - Blood, Arm, Right [[id number]]  (Abnormal) Collected:  04/12/20 0511    Specimen:  Blood from Arm, Right Updated:  04/13/20 0825     BCID, PCR Staphylococcus spp, not aureus. Identification by BCID PCR.     BOTTLE TYPE Aerobic Bottle    Blood Culture - Blood, Arm, Left [[id number]] Collected:  04/12/20 0511    Specimen:  Blood from Arm, Left Updated:  04/13/20 0546     Blood Culture No growth at 24 hours    Basic Metabolic Panel [[id number]]  (Abnormal) Collected:  04/13/20 0427    Specimen:  Blood Updated:  04/13/20 0458     Glucose 206 mg/dL      BUN 41 mg/dL      Creatinine 2.14 mg/dL      Sodium 147 mmol/L      Potassium 5.0 mmol/L      Chloride 104 mmol/L      CO2 16.0 mmol/L      Calcium 8.4 mg/dL      eGFR Non African Amer 22 mL/min/1.73      BUN/Creatinine Ratio 19.2     Anion Gap 27.0 mmol/L     Narrative:       GFR Normal >60  Chronic Kidney Disease <60  Kidney Failure <15      Magnesium [[id number]]  (Normal) Collected:  04/13/20 0427    Specimen:  Blood Updated:  04/13/20 0458     Magnesium 1.7 mg/dL     Lipid  Panel [[id number]]  (Abnormal) Collected:  04/13/20 0427    Specimen:  Blood Updated:  04/13/20 0458     Total Cholesterol 94 mg/dL      Triglycerides 106 mg/dL      HDL Cholesterol 37 mg/dL      LDL Cholesterol  36 mg/dL      VLDL Cholesterol 21.2 mg/dL      LDL/HDL Ratio 0.97    Narrative:       Cholesterol Reference Ranges  (U.S. Department of Health and Human Services ATP III Classifications)    Desirable          <200 mg/dL  Borderline High    200-239 mg/dL  High Risk          >240 mg/dL      Triglyceride Reference Ranges  (U.S. Department of Health and Human Services ATP III Classifications)    Normal           <150 mg/dL  Borderline High  150-199 mg/dL  High             200-499 mg/dL  Very High        >500 mg/dL    HDL Reference Ranges  (U.S. Department of Health and Human Services ATP III Classifcations)    Low     <40 mg/dl (major risk factor for CHD)  High    >60 mg/dl ('negative' risk factor for CHD)        LDL Reference Ranges  (U.S. Department of Health and Human Services ATP III Classifcations)    Optimal          <100 mg/dL  Near Optimal     100-129 mg/dL  Borderline High  130-159 mg/dL  High             160-189 mg/dL  Very High        >189 mg/dL    CK [[id number]]  (Abnormal) Collected:  04/13/20 0427    Specimen:  Blood Updated:  04/13/20 0458     Creatine Kinase 188 U/L     Vancomycin, Random [[id number]]  (Normal) Collected:  04/13/20 0426    Specimen:  Blood Updated:  04/13/20 0453     Vancomycin Random 7.40 mcg/mL     CBC & Differential [[id number]] Collected:  04/13/20 0426    Specimen:  Blood Updated:  04/13/20 0443    Narrative:       The following orders were created for panel order CBC & Differential.  Procedure                               Abnormality         Status                     ---------                               -----------         ------                     CBC Auto Differential[[id number]]        Abnormal            Final result                 Please view results for these  tests on the individual orders.    CBC Auto Differential [[id number]]  (Abnormal) Collected:  04/13/20 0426    Specimen:  Blood Updated:  04/13/20 0443     WBC 11.10 10*3/mm3      RBC 3.46 10*6/mm3      Hemoglobin 11.7 g/dL      Hematocrit 36.2 %      .6 fL      MCH 33.6 pg      MCHC 32.2 g/dL      RDW 18.8 %      RDW-SD 67.8 fl      MPV 10.2 fL      Platelets 133 10*3/mm3      Neutrophil % 90.4 %      Lymphocyte % 3.0 %      Monocyte % 6.2 %      Eosinophil % 0.0 %      Basophil % 0.4 %      Neutrophils, Absolute 10.10 10*3/mm3      Lymphocytes, Absolute 0.30 10*3/mm3      Monocytes, Absolute 0.70 10*3/mm3      Eosinophils, Absolute 0.00 10*3/mm3      Basophils, Absolute 0.00 10*3/mm3      nRBC 1.9 /100 WBC     Blood Gas, Arterial [[id number]]  (Abnormal) Collected:  04/13/20 0352    Specimen:  Arterial Blood Updated:  04/13/20 0359     Site Arterial Line     Inderjit's Test Positive     pH, Arterial 7.155 pH units      pCO2, Arterial 51.3 mm Hg      pO2, Arterial 93.1 mm Hg      HCO3, Arterial 18.1 mmol/L      Base Excess, Arterial -10.7 mmol/L      Comment: Serial Number: [id number]:  [id number]        O2 Saturation, Arterial 94.4 %      CO2 Content 19.7 mmol/L      Barometric Pressure for Blood Gas --     Comment: N/A        Modality Adult Vent     FIO2 100 %      Set Tidal Volume 400     PEEP 10     Hemodilution No     Respiratory Rate 30    Basic Metabolic Panel [[id number]]  (Abnormal) Collected:  04/13/20 0206    Specimen:  Blood Updated:  04/13/20 0229     Glucose 133 mg/dL      BUN 40 mg/dL      Creatinine 2.10 mg/dL      Sodium 152 mmol/L      Potassium 4.9 mmol/L      Chloride 107 mmol/L      CO2 17.0 mmol/L      Calcium 9.2 mg/dL      eGFR Non African Amer 23 mL/min/1.73      BUN/Creatinine Ratio 19.0     Anion Gap 28.0 mmol/L     Narrative:       GFR Normal >60  Chronic Kidney Disease <60  Kidney Failure <15      Phosphorus [[id number]]  (Abnormal) Collected:  04/13/20 0206    Specimen:  Blood Updated:   04/13/20 0226     Phosphorus 7.0 mg/dL     Magnesium [[id number]]  (Normal) Collected:  04/13/20 0206    Specimen:  Blood Updated:  04/13/20 0226     Magnesium 1.8 mg/dL     Basic Metabolic Panel [[id number]]  (Abnormal) Collected:  04/12/20 2328    Specimen:  Blood Updated:  04/13/20 0206     Glucose 95 mg/dL      BUN 38 mg/dL      Creatinine 1.94 mg/dL      Sodium 147 mmol/L      Potassium 4.7 mmol/L      Chloride 109 mmol/L      CO2 13.0 mmol/L      Calcium 7.2 mg/dL      eGFR Non African Amer 25 mL/min/1.73      BUN/Creatinine Ratio 19.6     Anion Gap 25.0 mmol/L     Narrative:       GFR Normal >60  Chronic Kidney Disease <60  Kidney Failure <15      Magnesium [[id number]]  (Normal) Collected:  04/12/20 2328    Specimen:  Blood Updated:  04/13/20 0206     Magnesium 1.6 mg/dL     Phosphorus [[id number]]  (Abnormal) Collected:  04/12/20 2328    Specimen:  Blood Updated:  04/13/20 0206     Phosphorus 6.5 mg/dL     Blood Gas, Arterial [[id number]]  (Abnormal) Collected:  04/13/20 0112    Specimen:  Arterial Blood Updated:  04/13/20 0130     Site Arterial Line     Inderjit's Test N/A     pH, Arterial 7.083 pH units      pCO2, Arterial 44.8 mm Hg      pO2, Arterial 67.0 mm Hg      HCO3, Arterial 13.4 mmol/L      Base Excess, Arterial -16.2 mmol/L      Comment: Serial Number: [id number]:  [id number]        O2 Saturation, Arterial 84.1 %      CO2 Content 14.7 mmol/L      Barometric Pressure for Blood Gas --     Comment: N/A        Modality Adult Vent     FIO2 100 %      Ventilator Mode ;AC     Set Tidal Volume 400     PEEP 10     Hemodilution No     Respiratory Rate 30    POC Lactate [[id number]]  (Abnormal) Collected:  04/13/20 0112    Specimen:  Blood Updated:  04/13/20 0130     Lactate 11.9 mmol/L      Comment: Serial Number: [id number]:  [id number]       POCT Electrolytes +HGB +HCT [[id number]]  (Abnormal) Collected:  04/13/20 0112    Specimen:  Blood Updated:  04/13/20 0117     Sodium 140 mmol/L      POC Potassium 5.2 mmol/L       Ionized Calcium 0.98 mmol/L      Comment: Serial Number: [id number]:  [id number]        Glucose 121 mg/dL      Hematocrit 40 %      Hemoglobin 13.8 g/dL     CBC (No Diff) [[id number]]  (Abnormal) Collected:  04/13/20 0044    Specimen:  Blood Updated:  04/13/20 0057     WBC 10.50 10*3/mm3      RBC 3.63 10*6/mm3      Hemoglobin 12.1 g/dL      Hematocrit 38.1 %      .1 fL      MCH 33.4 pg      MCHC 31.8 g/dL      RDW 18.7 %      RDW-SD 70.0 fl      MPV 10.2 fL      Platelets 153 10*3/mm3     POC Glucose Once [[id number]]  (Normal) Collected:  04/13/20 0044    Specimen:  Blood Updated:  04/13/20 0045     Glucose 92 mg/dL      Comment: Serial Number: [id number]:  [id number]       Troponin [[id number]]  (Abnormal) Collected:  04/12/20 2328    Specimen:  Blood Updated:  04/13/20 0004     Troponin T 0.191 ng/mL     Narrative:       Troponin T Reference Range:  <= 0.03 ng/mL-   Negative for AMI  >0.03 ng/mL-     Abnormal for myocardial necrosis.  Clinicians would have to utilize clinical acumen, EKG, Troponin and serial changes to determine if it is an Acute Myocardial Infarction or myocardial injury due to an underlying chronic condition.       Results may be falsely decreased if patient taking Biotin.      Blood Gas, Arterial [[id number]]  (Abnormal) Collected:  04/12/20 2216    Specimen:  Arterial Blood Updated:  04/12/20 2228     Site Arterial Line     Inderjit's Test N/A     pH, Arterial 7.167 pH units      pCO2, Arterial 50.2 mm Hg      pO2, Arterial 61.3 mm Hg      HCO3, Arterial 18.2 mmol/L      Base Excess, Arterial -10.5 mmol/L      Comment: Serial Number: [id number]:  [id number]        O2 Saturation, Arterial 83.7 %      CO2 Content 19.7 mmol/L      Barometric Pressure for Blood Gas --     Comment: N/A        Modality Adult Vent     FIO2 100 %      Ventilator Mode ;AC     Set Tidal Volume 400     PEEP 5     Hemodilution No     Respiratory Rate 24    Lactic Acid, Plasma [[id number]]  (Abnormal) Collected:   04/12/20 2123    Specimen:  Blood Updated:  04/12/20 2151     Lactate 6.7 mmol/L     Blood Gas, Arterial [[id number]]  (Abnormal) Collected:  04/12/20 1939    Specimen:  Arterial Blood Updated:  04/12/20 2005     Site Arterial Line     Inderjit's Test Positive     pH, Arterial 7.141 pH units      pCO2, Arterial 63.2 mm Hg      pO2, Arterial 63.4 mm Hg      HCO3, Arterial 21.6 mmol/L      Base Excess, Arterial -8.2 mmol/L      Comment: Serial Number: [id number]:  [id number]        O2 Saturation, Arterial 83.5 %      CO2 Content 23.5 mmol/L      Barometric Pressure for Blood Gas --     Comment: N/A        Modality Adult Vent     FIO2 100 %      Ventilator Mode ;AC     Hemodilution No    CK [[id number]]  (Normal) Collected:  04/12/20 1802    Specimen:  Blood Updated:  04/12/20 1918     Creatine Kinase 121 U/L     Lactic Acid, Reflex [[id number]]  (Abnormal) Collected:  04/12/20 1802    Specimen:  Blood Updated:  04/12/20 1840     Lactate 2.9 mmol/L     Troponin [[id number]]  (Abnormal) Collected:  04/12/20 1802    Specimen:  Blood Updated:  04/12/20 1834     Troponin T 0.117 ng/mL     Narrative:       Troponin T Reference Range:  <= 0.03 ng/mL-   Negative for AMI  >0.03 ng/mL-     Abnormal for myocardial necrosis.  Clinicians would have to utilize clinical acumen, EKG, Troponin and serial changes to determine if it is an Acute Myocardial Infarction or myocardial injury due to an underlying chronic condition.       Results may be falsely decreased if patient taking Biotin.      Comprehensive Metabolic Panel [[id number]]  (Abnormal) Collected:  04/12/20 1802    Specimen:  Blood Updated:  04/12/20 1831     Glucose 87 mg/dL      BUN 33 mg/dL      Creatinine 1.52 mg/dL      Sodium 146 mmol/L      Potassium 4.6 mmol/L      Chloride 113 mmol/L      CO2 17.0 mmol/L      Calcium 6.9 mg/dL      Total Protein 4.6 g/dL      Albumin 2.30 g/dL      ALT (SGPT) 18 U/L      AST (SGOT) 30 U/L      Alkaline Phosphatase 52 U/L      Total Bilirubin  0.7 mg/dL      eGFR Non African Amer 33 mL/min/1.73      Globulin 2.3 gm/dL      A/G Ratio 1.0 g/dL      BUN/Creatinine Ratio 21.7     Anion Gap 16.0 mmol/L     Narrative:       GFR Normal >60  Chronic Kidney Disease <60  Kidney Failure <15      CBC & Differential [[id number]] Collected:  04/12/20 1802    Specimen:  Blood Updated:  04/12/20 1812    Narrative:       The following orders were created for panel order CBC & Differential.  Procedure                               Abnormality         Status                     ---------                               -----------         ------                     CBC Auto Differential[[id number]]        Abnormal            Final result                 Please view results for these tests on the individual orders.    CBC Auto Differential [[id number]]  (Abnormal) Collected:  04/12/20 1802    Specimen:  Blood Updated:  04/12/20 1812     WBC 7.90 10*3/mm3      RBC 3.51 10*6/mm3      Hemoglobin 11.9 g/dL      Hematocrit 36.7 %      .6 fL      MCH 33.8 pg      MCHC 32.3 g/dL      RDW 18.9 %      RDW-SD 70.0 fl      MPV 9.8 fL      Platelets 158 10*3/mm3      Neutrophil % 89.3 %      Lymphocyte % 2.6 %      Monocyte % 7.9 %      Eosinophil % 0.0 %      Basophil % 0.2 %      Neutrophils, Absolute 7.00 10*3/mm3      Lymphocytes, Absolute 0.20 10*3/mm3      Monocytes, Absolute 0.60 10*3/mm3      Eosinophils, Absolute 0.00 10*3/mm3      Basophils, Absolute 0.00 10*3/mm3      nRBC 1.2 /100 WBC     Lactic Acid, Reflex Timer (This will reflex a repeat order 3-3:15 hours after ordered.) [[id number]] Collected:  04/12/20 1334    Specimen:  Blood Updated:  04/12/20 1715     Hold Tube Hold for add-ons.     Comment: Auto resulted.               Imaging and Other Studies:  Imaging Results (Last 72 Hours)     Procedure Component Value Units Date/Time    XR Chest 1 View [[id number]] Collected:  04/13/20 0001     Updated:  04/13/20 0204    Narrative:       EXAMINATION: XR CHEST 1 VW      DATE:  4/13/2020 1:31 AM    INDICATIONS: Code.    COMPARISON: April 12, 2020 11:40 PM    FINDINGS:     Endotracheal tube tip, good position. Right internal jugular central venous catheter tip in the superior vena cava. Enteric tube enters stomach, exits image.    Interlobular septal thickening has increased since the prior examination. Stable pleura, heart, aorta, mediastinum, and bernard.      Impression:         1. Satisfactory placement of the tubes and lines.  2. Worsening interstitial pulmonary edema, now moderate.  3. Stable moderate left pleural fluid collection. Stable small right pleural fluid collection.  4. Atelectasis versus pneumonia in the lower lungs, greater on the left.    Electronically signed by:  Jimi Gan M.D.    4/13/2020 12:03 AM    XR Abdomen KUB [[id number]] Collected:  04/12/20 1426     Updated:  04/12/20 1430    Narrative:       DATE OF EXAM:  4/12/2020 1:32 PM     PROCEDURE:  XR ABDOMEN KUB-     INDICATIONS:  NG tube placement.     COMPARISON:  Chest radiographs and CT chest 04/12/2020.     TECHNIQUE:   Single radiographic view of the abdomen was obtained.        FINDINGS:  Enteric tube terminating in the proximal stomach. Mildly air distended  stomach. Mild air distended small and large bowel loops with a mild  colonic stool burden. No evidence of pneumatosis. No concerning  calcifications. Partially imaged bilateral pleural effusions and left  greater than right bibasilar opacities. Incompletely evaluated  lumbosacral spondylosis. No acute osseous abnormality is identified.        Impression:       Enteric tube terminating in the proximal stomach.     Electronically Signed By-Vincenzo Ghosh On:4/12/2020 2:28 PM  This report was finalized on [id number] by  Vincenzo Ghosh, .    XR Chest 1 View [[id number]] Collected:  04/12/20 1424     Updated:  04/12/20 1428    Narrative:       DATE OF EXAM:  4/12/2020 1:33 PM     PROCEDURE:  XR CHEST 1 VW-     INDICATIONS:  Status post intubation. Rule out  COVID.     COMPARISON:  Radiographs and CT 04/12/2020.     TECHNIQUE:   Single radiographic AP view of the chest was obtained.     FINDINGS:  The study is limited by lordotic patient positioning. Overlying  artifacts. New endotracheal tube terminating in the lower thoracic  trachea, approximately 1 cm above the gregg. New enteric tube  terminating in the proximal stomach. Stable right IJ central venous  catheter. Stable left greater than right moderate bilateral pleural  effusions with underlying bibasilar opacities. No pneumothorax. Stable  cardiomegaly, likely accentuated by technique. No acute osseous  abnormality is identified.        Impression:          1. Limited study demonstrating a new endotracheal tube terminating in  the lower thoracic trachea, approximately 1 cm above the gregg.  2. Stable left greater than right moderate pleural effusions with  underlying bibasilar atelectasis and/or pneumonia.  3. Stable cardiomegaly, likely accentuated by technique.     Electronically Signed By-Vincenzo Ghosh On:4/12/2020 2:26 PM  This report was finalized on [id number] by  Vincenzo Ghosh, .    XR Chest 1 View [[id number]] Collected:  04/12/20 0843     Updated:  04/12/20 0910    Narrative:       DATE OF EXAM:  4/12/2020 8:00 AM     PROCEDURE:  XR CHEST 1 VW-     INDICATIONS:  Status post central line placement. Shortness of breath.     COMPARISON:  Chest radiograph and CT chest 04/12/2020.     TECHNIQUE:   Single radiographic AP view of the chest was obtained.     FINDINGS:  The study is limited by lordotic patient positioning. Overlying  artifacts. New right IJ central venous catheter terminating in the SVC.  No evidence of pneumothorax status post catheter placement. Stable left  greater than right moderate bilateral pleural effusions with underlying  bibasilar atelectasis and/or pneumonia. Biapical pleural-parenchymal  scarring. Calcified remnants of prior granulomatous disease. Stable  enlargement of the cardiac  silhouette, likely accentuated by technique.  Calcified atherosclerotic disease in the thoracic aorta. Lower cervical  spinal fusion hardware. No acute osseous abnormality is identified.        Impression:          1. Limited study demonstrating a right IJ central venous catheter  terminating in the SVC. No evidence of pneumothorax status post catheter  placement.  2. Stable left greater than right moderate bilateral pleural effusions  with underlying atelectasis and/or pneumonia.  3. Stable cardiomegaly, likely accentuated by technique.     Electronically Signed By-Vincenzo Ghosh On:4/12/2020 8:45 AM  This report was finalized on [id number] by  Vincenzo Ghosh, .    CT Head Without Contrast [[id number]] Collected:  04/12/20 0426     Updated:  04/12/20 0629    Narrative:       EXAMINATION: CT HEAD WO CONTRAST    DATE: 4/12/2020 6:03 AM     HISTORY:  Patient Data: Female, 79 years of age.  Clinical Indication : Altered loc.      COMPARISON: None available.     TECHNIQUE: Thin section noncontrast axial images were obtained through the head. Coronal reformatted images were then created. CT dose lowering techniques including automated exposure control, adjustment for patient size, and/or use of iterative   reconstruction were used.     FINDINGS:    Image quality: Mildly degraded by motion artifact and streak artifacts related to lead wire overlying the head. Imaging was repeated through the most affected areas.    Ventricles: Mildly enlarged due to involutional changes.     Parenchyma: Mild to moderate periventricular white matter hypodensities are present, most likely related to mild/moderate small vessel ischemic disease.   No CT evidence of confluent lobar cortical infarct.   No mass effect or midline shift.     Intracranial Hemorrhage: None.     Bones/Extracranial soft tissues: No depressed calvarial fracture.     Visualized Sinuses/Mastoids: Clear.          Impression:          1.  No acute intracranial process  detected.  2.  Volume loss and mild/moderate microvascular ischemic changes.          Clem Hernandez MD  Neuroradiologist    Thank you for this referral.  This exam was interpreted by a fellowship trained neuroradiologist.       SLOT:  [id number]      Electronically signed by:  Clem Hernandez    4/12/2020 4:28 AM    CT Chest Without Contrast [[id number]] Collected:  04/12/20 0422     Updated:  04/12/20 0625    Narrative:       EXAMINATION: CT CHEST WO CONTRAST    DATE: 4/12/2020 6:06 AM    INDICATION:  Dyspnea, altered mental status. ;    COMPARISON:  None.    PROCEDURE: Multiple axial CT images were obtained of the chest without IV contrast. Coronal and sagittal reformations were obtained.  CT dose lowering techniques were used, to include: automated exposure control, adjustment for patient size, and or use   of iterative reconstruction.    FINDINGS:    Cardiovascular: No aortic aneurysm is seen on this noncontrast exam.  No pericardial effusion.  Severe atherosclerotic calcifications of the thoracic aorta and branches including coronary arteries. Moderate cardiomegaly, especially left ventricle.    Mediastinum/Sanjuanita: No significant mediastinal or hilar adenopathy is seen.    Lungs/Pleura: Moderate bilateral pleural effusions. Minimal intralobular septal thickening. Minimal emphysematous changes lungs. Probable dependent atelectatic changes. Calcified granuloma left lower lobe.    Chest wall and Axilla: Unremarkable.    Bones:  No acute osseous abnormality. Minimal compression fracture deformity T11 vertebral body, chronic appearing.    Upper abdomen: Calcified granulomata liver and spleen.        Impression:         1.  Moderate bilateral pleural effusions. Minimal pulmonary interstitial edema. Moderate left ventricular enlargement. Severe atherosclerosis including coronary arteries.  2.  Minimal pulmonary emphysema.            Electronically signed by:  Rhonda Zuleta M.D.    4/12/2020 4:24 AM    XR Chest AP  [[id number]] Collected:  04/12/20 0558     Updated:  04/12/20 0602    Narrative:       DATE OF EXAM:  4/12/2020 5:15 AM     PROCEDURE:  XR CHEST AP-     INDICATIONS:  COVID-19 Evaluation, Cough, Fever; shortness of breath/dyspnea.     COMPARISON:  No Comparisons Available     TECHNIQUE:   A radiologic portable AP view of the chest was obtained.     FINDINGS:  A single AP upright portable chest radiograph reveals bilateral  atelectasis and/or infiltrate, greatest in the left lung base. The  findings may represent pulmonary edema. Infectious multifocal pneumonia  cannot be excluded. There may be small bilateral pleural effusions.  There is mild to moderate cardiomegaly. No pneumothorax is seen. The  thoracic aorta is atherosclerotic. There are postoperative changes of  the cervical spine. Chronic calcified granulomatous disease of the chest  is suspected.           Impression:       Bilateral infiltrates are seen with prominence of the pulmonary  vasculature. The findings may represent mild to moderate pulmonary edema  with vascular congestion. Infectious multifocal pneumonia cannot be  excluded. The greatest degree of pulmonary consolidation is in the left  lung base.      There is mild to moderate cardiomegaly.      Small bilateral pleural effusions may be present.      No pneumothorax is seen.     Electronically Signed By-Dr. Alex Gerard MD On:4/12/2020 6:00 AM  This report was finalized on [id number] by Dr. Alex Gerard MD.              JOÃO Padron

## 2020-04-13 NOTE — PROCEDURES
"Insert Arterial Line  Date/Time: 4/12/2020 8:02 PM  Performed by: Clem Worrell APRN  Authorized by: Clem Worrell APRN   Consent: The procedure was performed in an emergent situation.  Required items: required blood products, implants, devices, and special equipment available  Patient identity confirmed: arm band  Time out: Immediately prior to procedure a \"time out\" was called to verify the correct patient, procedure, equipment, support staff and site/side marked as required.  Preparation: Patient was prepped and draped in the usual sterile fashion.  Indications: multiple ABGs, respiratory failure and hemodynamic monitoring  Location: left femoral  Inderjit's test normal: yes  Needle gauge: 20  Seldinger technique: Seldinger technique used  Number of attempts: 1  Post-procedure: line sutured and dressing applied  Post-procedure CMS: unchanged  Patient tolerance: Patient tolerated the procedure well with no immediate complications        "

## 2020-04-13 NOTE — PROGRESS NOTES
"Pharmacy Antimicrobial Dosing Service    Subjective:  Natalie Najera is a 79 y.o.female admitted with principle problem of pleural effusion. Pharmacy has been consulted to dose Vancomycin for possible sepsis.      Assessment/Plan    1. Day #2 Vancomycin: Pulse dosing d/t renal dysfxn. Patient received vancomycin 750 mg (15 mg/kg ABW) IV x1 4/12. Random with AM labs ~24 hours after last dose=7.4mcg/mL. Redosed with 750mg IV x1 today, and will obtain random level tomorrow w/ AM labs. Plan to redose when random level < 20 mcg/mL.    2. Day #2 Cefepime: 1 gm IV q24h for estCrCl < 30 mL/min and ABW 51 kg.    Will continue to monitor drug levels, renal function, culture and sensitivities, and patient clinical status.       Objective:  Relevant clinical data and objective history reviewed:  160 cm (63\")   58.1 kg (128 lb)   Ideal body weight: 52.4 kg (115 lb 8.3 oz)  Adjusted ideal body weight: 54.7 kg (120 lb 8.2 oz)  Body mass index is 22.67 kg/m².    Results from last 7 days   Lab Units 04/13/20  0426   VANCOMYCIN RM mcg/mL 7.40     Results from last 7 days   Lab Units 04/13/20  0427 04/13/20  0206 04/12/20  2328   CREATININE mg/dL 2.14* 2.10* 1.94*     Estimated Creatinine Clearance: 19.6 mL/min (A) (by C-G formula based on SCr of 2.14 mg/dL (H)).  I/O last 3 completed shifts:  In: 7923.8 [I.V.:6323.8; IV Piggyback:1600]  Out: 411 [Urine:11; Emesis/NG output:400]    Results from last 7 days   Lab Units 04/13/20  0426 04/13/20  0044 04/12/20  1802   WBC 10*3/mm3 11.10* 10.50 7.90     Temperature    04/12/20 1600 04/13/20 0400 04/13/20 0800   Temp: 96.9 °F (36.1 °C) 96 °F (35.6 °C) 96.4 °F (35.8 °C)     Baseline culture/source/susceptibility:  Microbiology Results (last 10 days)       Procedure Component Value - Date/Time    Respiratory Culture - Sputum, Cough [[id number]] Collected:  04/13/20 0865    Lab Status:  Preliminary result Specimen:  Sputum from Cough Updated:  04/13/20 0958     Gram Stain Moderate (3+) WBCs " per low power field      No organisms seen    S. Pneumo Ag Urine or CSF - Urine, Urine, Catheter [[id number]]  (Normal) Collected:  04/12/20 1326    Lab Status:  Final result Specimen:  Urine, Catheter Updated:  04/12/20 1412     Strep Pneumo Ag Negative    Legionella Antigen, Urine - Urine, Urine, Catheter [[id number]]  (Normal) Collected:  04/12/20 1326    Lab Status:  Final result Specimen:  Urine, Catheter Updated:  04/12/20 1412     LEGIONELLA ANTIGEN, URINE Negative    MRSA Screen, PCR (Inpatient) - Swab, Nares [[id number]]  (Normal) Collected:  04/12/20 1225    Lab Status:  Final result Specimen:  Swab from Nares Updated:  04/12/20 1445     MRSA PCR No MRSA Detected    Blood Culture - Blood, Arm, Right [[id number]]  (Abnormal) Collected:  04/12/20 0511    Lab Status:  Preliminary result Specimen:  Blood from Arm, Right Updated:  04/13/20 0825     Blood Culture Abnormal Stain     Gram Stain Aerobic Bottle Gram positive cocci in clusters    Blood Culture - Blood, Arm, Left [[id number]] Collected:  04/12/20 0511    Lab Status:  Preliminary result Specimen:  Blood from Arm, Left Updated:  04/13/20 0546     Blood Culture No growth at 24 hours    Blood Culture ID, PCR - Blood, Arm, Right [[id number]]  (Abnormal) Collected:  04/12/20 0511    Lab Status:  Final result Specimen:  Blood from Arm, Right Updated:  04/13/20 0825     BCID, PCR Staphylococcus spp, not aureus. Identification by BCID PCR.     BOTTLE TYPE Aerobic Bottle    Respiratory Panel, PCR - Swab, Nasopharynx [[id number]]  (Normal) Collected:  04/12/20 0510    Lab Status:  Final result Specimen:  Swab from Nasopharynx Updated:  04/12/20 0700     ADENOVIRUS, PCR Not Detected     Coronavirus 229E Not Detected     Coronavirus HKU1 Not Detected     Coronavirus NL63 Not Detected     Coronavirus OC43 Not Detected     Human Metapneumovirus Not Detected     Human Rhinovirus/Enterovirus Not Detected     Influenza B PCR Not Detected     Parainfluenza Virus 1 Not Detected      Parainfluenza Virus 2 Not Detected     Parainfluenza Virus 3 Not Detected     Parainfluenza Virus 4 Not Detected     Bordetella pertussis pcr Not Detected     Influenza A H1 2009 PCR Not Detected     Chlamydophila pneumoniae PCR Not Detected     Mycoplasma pneumo by PCR Not Detected     Influenza A PCR Not Detected     Influenza A H3 Not Detected     Influenza A H1 Not Detected     RSV, PCR Not Detected    Narrative:       The coronavirus on the RVP is NOT COVID-19 and is NOT indicative of infection with COVID-19.     SARS-CoV-2, PCR (IN-HOUSE), NP Swab in [employer] - Swab, Nasopharynx [[id number]]  (Normal) Collected:  04/12/20 0510    Lab Status:  Final result Specimen:  Swab from Nasopharynx Updated:  04/12/20 1347     COVID19 Not Detected             Anti-Infectives (From admission, onward)      Ordered     Dose/Rate Route Frequency Start Stop    04/13/20 0800  vancomycin 750 mg in sodium chloride 0.9 % 100 mL IVPB     Ordering Provider:  Joey Barlow MD    750 mg  over 60 Minutes Intravenous Once 04/13/20 0900 04/13/20 0937    04/12/20 1128  cefepime 1 gm IVPB in 100 mL NS (MBP)  Review   Ordering Provider:  Joey Barlow MD    1 g  over 30 Minutes Intravenous Every 24 Hours 04/13/20 0400 04/19/20 0359    04/12/20 1128  vancomycin 750 mg in sodium chloride 0.9 % 100 mL IVPB  Review   Ordering Provider:  Joey Barlow MD    15 mg/kg × 51.2 kg Intravenous As Needed 04/12/20 1127 04/19/20 1126    04/12/20 0936  Pharmacy to dose vancomycin  Review   Ordering Provider:  Georgina Estrada APRN     Does not apply Continuous PRN 04/12/20 0935 04/19/20 0934    04/12/20 0503  vancomycin 750 mg in sodium chloride 0.9 % 100 mL IVPB     Ordering Provider:  Jimi Sosa MD    750 mg Intravenous Once 04/12/20 0530 04/12/20 0657    04/12/20 0503  ceFEPime (MAXIPIME) in SWFI 2g/10ml IV PUSH syringe     Ordering Provider:  Jimi Sosa MD    2 g  over 5 Minutes Intravenous Once 04/12/20 0505  04/12/20 0552            Crys Cheng, PharmD  04/13/20 10:15

## 2020-04-13 NOTE — PROGRESS NOTES
PULMONARY/ CRITICAL CARE/ SLEEP MEDICINE PROGRESS NOTE        Patient Name:  Natalie Najera    :  1940    Medical Record:  [id number]    PRIMARY CARE PHYSICIAN     Provider, No Known    [place]  Natalie Najera is a 79 y.o. female who presented to the emergency department for evaluation following a fall at home.  The following information has been obtained primarily from review of documentation and collaboration with the emergency department physician as patient is confused and a very poor historian.  EMS transported the patient this morning with complaint of weakness, shortness of air, poor appetite and reported hypoxemia.  The patient's daughter reportedly called EMS reporting those symptoms as well as the patient had fallen yesterday and had laid on the floor for 12 to 24 hours.  EMS reported that they found the patient lying in stool and urine.  During transport the patient was noted to have saturations in the 80s and was given 3 L O2 with improvement.  In the emergency department the patient was asked if she could remember falling and she reported that she was simply lying on the floor because she did not have a bed to sleep on.  She denies that she takes medications and has not seen a doctor in at least 10 years.  She is a 1 pack/day smoker for most of her life and reports that she sometimes drinks alcohol however is unable to quantify it.    In the emergency department the patient was noted to be hypotensive and received 30 mL/kg IV fluid resuscitation per sepsis protocol with improvement in her blood pressure.  Chest x-ray revealed pulmonary edema and effusions.  The patient was admitted to the intensive care unit for further evaluation and treatment of severe sepsis and hypoxic respiratory failure.    The patient is a poor historian however she denies recent fever, chills, nausea, vomiting, chest pain.  She does report that she has been weak and short of breath.      :  Multiple pressors, HCO3  drip, and intubated.  Code 4 this AM.  Some bright red blood reported in stool per nursing    REVIEW OF SYSTEMS    as above    MEDICAL HISTORY    No past medical history on file.     SURGICAL HISTORY    Past Surgical History:   Procedure Laterality Date   • APPENDECTOMY          FAMILY HISTORY    No family history on file.    SOCIAL HISTORY    Social History     Tobacco Use   • Smoking status: Current Every Day Smoker     Packs/day: 1.00   • Smokeless tobacco: Never Used   Substance Use Topics   • Alcohol use: Not on file       ALLERGIES    No Known Allergies    MEDICATIONS    Scheduled Meds:    cefepime 1 g Intravenous Q24H   heparin (porcine) 5,000 Units Subcutaneous Q8H   hydrocortisone sodium succinate 100 mg Intravenous Q8H   pantoprazole 40 mg Intravenous Q AM   sodium chloride 10 mL Intravenous Q12H   vancomycin 750 mg Intravenous Once     Continuous Infusions:    DOPamine 2-20 mcg/kg/min Last Rate: 20 mcg/kg/min (20 0701)   EPINEPHrine 0.02-0.3 mcg/kg/min Last Rate: 0.3 mcg/kg/min (20 0455)   HYDROmorphone 0.2-3 mg/hr Last Rate: Stopped (20 1737)   midazolam 1 mg/mL 100mL NS 1-10 mg/hr Last Rate: Stopped (20 1709)   norepinephrine 0.02-0.3 mcg/kg/min Last Rate: 0.5 mcg/kg/min (20 0434)   Pharmacy to dose vancomycin     phenylephrine 0.5-3 mcg/kg/min Last Rate: 6 mcg/kg/min (2036)   propofol 5-50 mcg/kg/min    sodium bicarbonate drip (greater than 75 mEq/bag) 150 mEq Last Rate: 150 mEq (20)   vasopressin 0.03 Units/min Last Rate: 0.03 Units/min (20 0455)     PRN Meds:.•  aluminum-magnesium hydroxide-simethicone  •  bisacodyl  •  fentaNYL citrate (PF)  •  magnesium hydroxide  •  ondansetron **OR** ondansetron  •  Pharmacy to dose vancomycin  •  sodium chloride  •  sodium chloride  •  vancomycin      PHYSICAL EXAM    tMax 24 hrs:  Temp (24hrs), Av.5 °F (35.8 °C), Min:96 °F (35.6 °C), Max:96.9 °F (36.1 °C)    Vitals Ranges:  Temp:  [96 °F (35.6 °C)-96.9  °F (36.1 °C)] 96 °F (35.6 °C)  Heart Rate:  [] 108  BP: ()/(16-91) 107/31  Arterial Line BP: ()/(30-46) 113/42  FiO2 (%):  [80 %-100 %] 100 %  Intake and Output Last 3 Shifts:  I/O last 3 completed shifts:  In: 7923.8 [I.V.:6323.8; IV Piggyback:1600]  Out: 411 [Urine:11; Emesis/NG output:400]    Constitutional: Acutely and chronically ill-appearing.  Emaciated and cachectic, intubated  Eyes:  PERRL, conjunctivae normal   HENT:  Atraumatic, external ears normal, nose normal, oropharynx dry, no pharyngeal exudates. OETT, Bilateral JVD, right IJ central line  Respiratory: diminished bilaterally, crackles in bases, no wheezing  Cardiovascular: Sinus tachycardia, no murmurs, no gallops, no rubs   GI:  Soft, nondistended, normal bowel sounds, nontender, no rebound, no guarding   : Deferred  Musculoskeletal: Pitting bilateral ankle edema, no tenderness, no deformities.  No clubbing or cyanosis  Integument: Poor turgor, no rash   Neurologic:  Withdraws to pain, not following commands  Psychiatric: unable to assess     LABS    Reviewed  Microbiology Results (last 10 days)     Procedure Component Value - Date/Time    S. Pneumo Ag Urine or CSF - Urine, Urine, Catheter [[id number]]  (Normal) Collected:  04/12/20 1326    Lab Status:  Final result Specimen:  Urine, Catheter Updated:  04/12/20 1412     Strep Pneumo Ag Negative    Legionella Antigen, Urine - Urine, Urine, Catheter [[id number]]  (Normal) Collected:  04/12/20 1326    Lab Status:  Final result Specimen:  Urine, Catheter Updated:  04/12/20 1412     LEGIONELLA ANTIGEN, URINE Negative    MRSA Screen, PCR (Inpatient) - Swab, Nares [[id number]]  (Normal) Collected:  04/12/20 1225    Lab Status:  Final result Specimen:  Swab from Nares Updated:  04/12/20 1445     MRSA PCR No MRSA Detected    Blood Culture - Blood, Arm, Right [[id number]]  (Abnormal) Collected:  04/12/20 0511    Lab Status:  Preliminary result Specimen:  Blood from Arm, Right Updated:   04/13/20 0825     Blood Culture Abnormal Stain     Gram Stain Aerobic Bottle Gram positive cocci in clusters    Blood Culture - Blood, Arm, Left [[id number]] Collected:  04/12/20 0511    Lab Status:  Preliminary result Specimen:  Blood from Arm, Left Updated:  04/13/20 0546     Blood Culture No growth at 24 hours    Blood Culture ID, PCR - Blood, Arm, Right [[id number]]  (Abnormal) Collected:  04/12/20 0511    Lab Status:  Final result Specimen:  Blood from Arm, Right Updated:  04/13/20 0825     BCID, PCR Staphylococcus spp, not aureus. Identification by BCID PCR.     BOTTLE TYPE Aerobic Bottle    Respiratory Panel, PCR - Swab, Nasopharynx [[id number]]  (Normal) Collected:  04/12/20 0510    Lab Status:  Final result Specimen:  Swab from Nasopharynx Updated:  04/12/20 0700     ADENOVIRUS, PCR Not Detected     Coronavirus 229E Not Detected     Coronavirus HKU1 Not Detected     Coronavirus NL63 Not Detected     Coronavirus OC43 Not Detected     Human Metapneumovirus Not Detected     Human Rhinovirus/Enterovirus Not Detected     Influenza B PCR Not Detected     Parainfluenza Virus 1 Not Detected     Parainfluenza Virus 2 Not Detected     Parainfluenza Virus 3 Not Detected     Parainfluenza Virus 4 Not Detected     Bordetella pertussis pcr Not Detected     Influenza A H1 2009 PCR Not Detected     Chlamydophila pneumoniae PCR Not Detected     Mycoplasma pneumo by PCR Not Detected     Influenza A PCR Not Detected     Influenza A H3 Not Detected     Influenza A H1 Not Detected     RSV, PCR Not Detected    Narrative:       The coronavirus on the RVP is NOT COVID-19 and is NOT indicative of infection with COVID-19.     SARS-CoV-2, PCR (IN-HOUSE), NP Swab in [employer] - Swab, Nasopharynx [[id number]]  (Normal) Collected:  04/12/20 0510    Lab Status:  Final result Specimen:  Swab from Nasopharynx Updated:  04/12/20 1347     COVID19 Not Detected           IMAGING & OTHER STUDIES    Imaging Results (Last 72 Hours)      Procedure Component Value Units Date/Time    XR Chest 1 View [[id number]] Collected:  04/13/20 0001     Updated:  04/13/20 0204    Narrative:       EXAMINATION: XR CHEST 1 VW      DATE: 4/13/2020 1:31 AM    INDICATIONS: Code.    COMPARISON: April 12, 2020 11:40 PM    FINDINGS:     Endotracheal tube tip, good position. Right internal jugular central venous catheter tip in the superior vena cava. Enteric tube enters stomach, exits image.    Interlobular septal thickening has increased since the prior examination. Stable pleura, heart, aorta, mediastinum, and bernard.      Impression:         1. Satisfactory placement of the tubes and lines.  2. Worsening interstitial pulmonary edema, now moderate.  3. Stable moderate left pleural fluid collection. Stable small right pleural fluid collection.  4. Atelectasis versus pneumonia in the lower lungs, greater on the left.    Electronically signed by:  Jimi Gan M.D.    4/13/2020 12:03 AM    XR Abdomen KUB [[id number]] Collected:  04/12/20 1426     Updated:  04/12/20 1430    Narrative:       DATE OF EXAM:  4/12/2020 1:32 PM     PROCEDURE:  XR ABDOMEN KUB-     INDICATIONS:  NG tube placement.     COMPARISON:  Chest radiographs and CT chest 04/12/2020.     TECHNIQUE:   Single radiographic view of the abdomen was obtained.        FINDINGS:  Enteric tube terminating in the proximal stomach. Mildly air distended  stomach. Mild air distended small and large bowel loops with a mild  colonic stool burden. No evidence of pneumatosis. No concerning  calcifications. Partially imaged bilateral pleural effusions and left  greater than right bibasilar opacities. Incompletely evaluated  lumbosacral spondylosis. No acute osseous abnormality is identified.        Impression:       Enteric tube terminating in the proximal stomach.     Electronically Signed By-Vincenzo Ghosh On:4/12/2020 2:28 PM  This report was finalized on [id number] by  Vincenzo Ghosh, .    XR Chest 1 View [[id number]] Collected:   04/12/20 1424     Updated:  04/12/20 1428    Narrative:       DATE OF EXAM:  4/12/2020 1:33 PM     PROCEDURE:  XR CHEST 1 VW-     INDICATIONS:  Status post intubation. Rule out COVID.     COMPARISON:  Radiographs and CT 04/12/2020.     TECHNIQUE:   Single radiographic AP view of the chest was obtained.     FINDINGS:  The study is limited by lordotic patient positioning. Overlying  artifacts. New endotracheal tube terminating in the lower thoracic  trachea, approximately 1 cm above the gregg. New enteric tube  terminating in the proximal stomach. Stable right IJ central venous  catheter. Stable left greater than right moderate bilateral pleural  effusions with underlying bibasilar opacities. No pneumothorax. Stable  cardiomegaly, likely accentuated by technique. No acute osseous  abnormality is identified.        Impression:          1. Limited study demonstrating a new endotracheal tube terminating in  the lower thoracic trachea, approximately 1 cm above the gregg.  2. Stable left greater than right moderate pleural effusions with  underlying bibasilar atelectasis and/or pneumonia.  3. Stable cardiomegaly, likely accentuated by technique.     Electronically Signed By-Vincenzo Ghosh On:4/12/2020 2:26 PM  This report was finalized on [id number] by  Vincenzo Ghosh, .    XR Chest 1 View [[id number]] Collected:  04/12/20 0843     Updated:  04/12/20 0910    Narrative:       DATE OF EXAM:  4/12/2020 8:00 AM     PROCEDURE:  XR CHEST 1 VW-     INDICATIONS:  Status post central line placement. Shortness of breath.     COMPARISON:  Chest radiograph and CT chest 04/12/2020.     TECHNIQUE:   Single radiographic AP view of the chest was obtained.     FINDINGS:  The study is limited by lordotic patient positioning. Overlying  artifacts. New right IJ central venous catheter terminating in the SVC.  No evidence of pneumothorax status post catheter placement. Stable left  greater than right moderate bilateral pleural effusions with  underlying  bibasilar atelectasis and/or pneumonia. Biapical pleural-parenchymal  scarring. Calcified remnants of prior granulomatous disease. Stable  enlargement of the cardiac silhouette, likely accentuated by technique.  Calcified atherosclerotic disease in the thoracic aorta. Lower cervical  spinal fusion hardware. No acute osseous abnormality is identified.        Impression:          1. Limited study demonstrating a right IJ central venous catheter  terminating in the SVC. No evidence of pneumothorax status post catheter  placement.  2. Stable left greater than right moderate bilateral pleural effusions  with underlying atelectasis and/or pneumonia.  3. Stable cardiomegaly, likely accentuated by technique.     Electronically Signed By-Vincenzo Ghosh On:4/12/2020 8:45 AM  This report was finalized on [id number] by  Vincenzo Ghosh, .    CT Head Without Contrast [[id number]] Collected:  04/12/20 0426     Updated:  04/12/20 0629    Narrative:       EXAMINATION: CT HEAD WO CONTRAST    DATE: 4/12/2020 6:03 AM     HISTORY:  Patient Data: Female, 79 years of age.  Clinical Indication : Altered loc.      COMPARISON: None available.     TECHNIQUE: Thin section noncontrast axial images were obtained through the head. Coronal reformatted images were then created. CT dose lowering techniques including automated exposure control, adjustment for patient size, and/or use of iterative   reconstruction were used.     FINDINGS:    Image quality: Mildly degraded by motion artifact and streak artifacts related to lead wire overlying the head. Imaging was repeated through the most affected areas.    Ventricles: Mildly enlarged due to involutional changes.     Parenchyma: Mild to moderate periventricular white matter hypodensities are present, most likely related to mild/moderate small vessel ischemic disease.   No CT evidence of confluent lobar cortical infarct.   No mass effect or midline shift.     Intracranial Hemorrhage: None.      Bones/Extracranial soft tissues: No depressed calvarial fracture.     Visualized Sinuses/Mastoids: Clear.          Impression:          1.  No acute intracranial process detected.  2.  Volume loss and mild/moderate microvascular ischemic changes.          Clem Hernandez MD  Neuroradiologist    Thank you for this referral.  This exam was interpreted by a fellowship trained neuroradiologist.       SLOT:  [id number]      Electronically signed by:  Clem Hernandez    4/12/2020 4:28 AM    CT Chest Without Contrast [[id number]] Collected:  04/12/20 0422     Updated:  04/12/20 0625    Narrative:       EXAMINATION: CT CHEST WO CONTRAST    DATE: 4/12/2020 6:06 AM    INDICATION:  Dyspnea, altered mental status. ;    COMPARISON:  None.    PROCEDURE: Multiple axial CT images were obtained of the chest without IV contrast. Coronal and sagittal reformations were obtained.  CT dose lowering techniques were used, to include: automated exposure control, adjustment for patient size, and or use   of iterative reconstruction.    FINDINGS:    Cardiovascular: No aortic aneurysm is seen on this noncontrast exam.  No pericardial effusion.  Severe atherosclerotic calcifications of the thoracic aorta and branches including coronary arteries. Moderate cardiomegaly, especially left ventricle.    Mediastinum/Sanjuanita: No significant mediastinal or hilar adenopathy is seen.    Lungs/Pleura: Moderate bilateral pleural effusions. Minimal intralobular septal thickening. Minimal emphysematous changes lungs. Probable dependent atelectatic changes. Calcified granuloma left lower lobe.    Chest wall and Axilla: Unremarkable.    Bones:  No acute osseous abnormality. Minimal compression fracture deformity T11 vertebral body, chronic appearing.    Upper abdomen: Calcified granulomata liver and spleen.        Impression:         1.  Moderate bilateral pleural effusions. Minimal pulmonary interstitial edema. Moderate left ventricular enlargement. Severe  atherosclerosis including coronary arteries.  2.  Minimal pulmonary emphysema.            Electronically signed by:  Rhonda Zuleta M.D.    4/12/2020 4:24 AM    XR Chest AP [[id number]] Collected:  04/12/20 0558     Updated:  04/12/20 0602    Narrative:       DATE OF EXAM:  4/12/2020 5:15 AM     PROCEDURE:  XR CHEST AP-     INDICATIONS:  COVID-19 Evaluation, Cough, Fever; shortness of breath/dyspnea.     COMPARISON:  No Comparisons Available     TECHNIQUE:   A radiologic portable AP view of the chest was obtained.     FINDINGS:  A single AP upright portable chest radiograph reveals bilateral  atelectasis and/or infiltrate, greatest in the left lung base. The  findings may represent pulmonary edema. Infectious multifocal pneumonia  cannot be excluded. There may be small bilateral pleural effusions.  There is mild to moderate cardiomegaly. No pneumothorax is seen. The  thoracic aorta is atherosclerotic. There are postoperative changes of  the cervical spine. Chronic calcified granulomatous disease of the chest  is suspected.           Impression:       Bilateral infiltrates are seen with prominence of the pulmonary  vasculature. The findings may represent mild to moderate pulmonary edema  with vascular congestion. Infectious multifocal pneumonia cannot be  excluded. The greatest degree of pulmonary consolidation is in the left  lung base.      There is mild to moderate cardiomegaly.      Small bilateral pleural effusions may be present.      No pneumothorax is seen.     Electronically Signed By-Dr. Alex Gerard MD On:4/12/2020 6:00 AM  This report was finalized on [id number] by Dr. Alex Gerard MD.          ASSESSMENT    Severe sepsis/Shock, multifactorial with UTI and probable community-acquired pneumonia  -Patient received 30 mL/kg IV fluid resuscitation in the emergency department  -SARS-COVID swab NEGATIVE  -Urine antigens for Legionella and strep pneumo negative  -Blood cultures neg to date  -Sputum  culture pending  -UA reviewed   -  -Lactate 2.2  -C-reactive protein 5.11  -Procalcitonin 0.25  -Cont cefepime and vancomycin    Acute hypoxic/hypercapnic respiratory failure  -Intubated, vent reviewed.  ABG reviewed   -Multifactorial with probable acute exacerbation COPD, community-acquired pneumonia, fluid volume overload with pleural effusions  -Respiratory viral panel negative  -proBNP> 70,000  -Echocardiogram pending  -Bumex 3 mg IV x 1, check CVP and mixed gas  -currently off sedation    Bilateral pleural effusions   -CXR and CT chest reviewed  -Bumex 3 mg IV x 1 today    Elevated troponin  -Multifactorial with demand ischemia, LEIGHTON, age  -ECHO pending  -monitor    LEIGHTON, multifactorial with UTI, sepsis  -Has received IV fluid resuscitation  -creatinine 2.14  -Monitor and trend    Mild rhabdomyolysis secondary to fall at home  -  -Urine and serum myoglobin pending  -Patient received IV fluid resuscitation    Protein malnutrition  -Consulted dietitian       PUD prophylaxis with Protonix  DVT prophylaxis Heparin subcu      Trying to get in touch with family to discuss patients current condition and goals of care.  Social work consult to assist in locating family.  Patient would not be a good candidate for HD treatment as she is on multiple pressors and blood pressure would likely not tolerate.  Prognosis is poor       4/13/20:  I (JOÃO Townsend) spoke to the patients two daughters Stella and Valarie about their mothers current condition.  At this time they both expressed wishes to change their mothers code status to comfort measures only.  They both understood and were in agreement for terminal extubation and to stop all current treatment and life sustaining measures.      Addendum  I have seen this patient independently and reviewed the medical records, labs and imaging and I agree with the note above as scribed for me by JOÃO. I have corrected the note above for accuracy.    As noted above she is  critically ill and had a very poor prognosis overall.  Further discussion this afternoon the family has decided to move to comfort measures alone.  Plan on stopping all treatments and moving towards withdrawal of life support.    32 minutes of critical care provided. This time excludes other billable procedures. Time does include preparation of documents, medical consultations, review of old records, and direct bedside care. Patient was at high risk for life-threatening deterioration due to multiple pressors on life support intubated.     Al Eagle MD

## 2020-04-13 NOTE — PLAN OF CARE
Problem: Fall Risk (Adult)  Goal: Identify Related Risk Factors and Signs and Symptoms  Outcome: Ongoing (interventions implemented as appropriate)  Flowsheets (Taken 4/13/2020 6261)  Related Risk Factors (Fall Risk): age-related changes; confusion/agitation  Note:   Patient is currently on the ventilator and at the limit of all pressors. Sepsis of unknown origin is likely. Results of urine and blood cultures are pending. Sputum has yet to be collected. Patient had a pulseless episode at 0130, CPR was performed and IV calcium and epi was given. Patient ROSC at 0132. Xray was performed and found that ribs were broken, but no obvious pneumothorax seen. Waiting on blood drawn after the code. Family was again called and unable to be reached. Continuing to monitor the patient.  Goal: Absence of Fall  Outcome: Ongoing (interventions implemented as appropriate)     Problem: Patient Care Overview  Goal: Plan of Care Review  Outcome: Ongoing (interventions implemented as appropriate)  Goal: Individualization and Mutuality  Outcome: Ongoing (interventions implemented as appropriate)  Goal: Discharge Needs Assessment  Outcome: Ongoing (interventions implemented as appropriate)  Goal: Interprofessional Rounds/Family Conf  Outcome: Ongoing (interventions implemented as appropriate)     Problem: Skin Injury Risk (Adult)  Goal: Identify Related Risk Factors and Signs and Symptoms  Outcome: Ongoing (interventions implemented as appropriate)  Goal: Skin Health and Integrity  Outcome: Ongoing (interventions implemented as appropriate)     Problem: Sepsis/Septic Shock (Adult)  Goal: Signs and Symptoms of Listed Potential Problems Will be Absent, Minimized or Managed (Sepsis/Septic Shock)  Outcome: Ongoing (interventions implemented as appropriate)     Problem: Ventilation, Mechanical Invasive (Adult)  Goal: Signs and Symptoms of Listed Potential Problems Will be Absent, Minimized or Managed (Ventilation, Mechanical  Invasive)  Outcome: Ongoing (interventions implemented as appropriate)

## 2020-04-13 NOTE — PROGRESS NOTES
Continued Stay Note   [facility]     Patient Name: Natalie Najera  MRN: [id number]  Today's Date: 4/13/2020    Admit Date: 4/12/2020    Discharge Plan     Row Name 04/13/20 1241       Plan    Plan Comments  SW able to locate the following family members and contact information. #1 Valarie Amos (daughter) #[phone] #2 Stella Amos (daughter) #[phone] #3 Jame Brandie (sister) #[phone] #4 Queenie Grajeda (sister) #no known phone number would have to have a neighbor take her a phone #5 Rola (niece) #[phone] #6 Lien Muro (niece) #[phone]    Row Name 04/13/20 1125                         Discharge Codes    No documentation.         KIM Mckinley    Phone # [phone]  Cell #[phone]  Fax#[phone]  [email or web address]      KIM Mckinley

## 2020-04-13 NOTE — CONSULTS
INITIAL CONSULT NOTE      Patient Name: Natalie Najera  : 1940  MRN: [id number]  Primary Care Physician: Provider, No Known  Date of admission: 2020    Patient Care Team:  Provider, No Known as PCP - General        Reason for Consult:       Renal failure almost anuric  Subjective   History of Present Illness:   Chief Complaint:   Chief Complaint   Patient presents with   • Weakness - Generalized     HISTORY:  Natalie Najera is a 79 y.o. female with past medical history of With unknown past medical history. who presents with weakness, shortness of breath decreased p.o. intake.  Patient was admitted on yesterday morning.  Since then patient was intubated for respiratory distress.  Still on 70% FiO2.  Overnight patient hemodynamically unstable blood pressure keeps dropping right now patient is on 3 pressors on bicarbonate drip at 150 cc an hour.  No urine output in last 4 to 6 hours.  Patient has a Cornell catheter.  Potassium is running high.  Extremely acidotic with last bicarb level of 16 and pH of 7.22.  I think patient has can history of diabetes mellitus as blood sugar is running high.  At this time no family is available.  Hospital trying to talk to the family but numbers are not working.  At this time patient is on multiple pressors and last systolic is around 95.    Review of systems:  All other review of system could not be obtained as patient is intubated        Personal History:     Past Medical History: No past medical history on file.    Surgical History:      Past Surgical History:   Procedure Laterality Date   • APPENDECTOMY         Family History: family history is not on file. Otherwise pertinent FHx was reviewed and unremarkable.     Social History:  reports that she has been smoking. She has been smoking about 1.00 pack per day. She has never used smokeless tobacco. She reports that she has current or past drug history.    Medications:  Prior to Admission medications    Not on  File     Scheduled Meds:  cefepime 1 g Intravenous Q24H   heparin (porcine) 5,000 Units Subcutaneous Q8H   hydrocortisone sodium succinate 100 mg Intravenous Q8H   pantoprazole 40 mg Intravenous Q AM   sodium chloride 10 mL Intravenous Q12H     Continuous Infusions:  DOPamine 2-20 mcg/kg/min Last Rate: 20 mcg/kg/min (04/13/20 0701)   EPINEPHrine 0.02-0.3 mcg/kg/min Last Rate: 0.3 mcg/kg/min (04/13/20 0455)   HYDROmorphone 0.2-3 mg/hr Last Rate: Stopped (04/12/20 1737)   midazolam 1 mg/mL 100mL NS 1-10 mg/hr Last Rate: Stopped (04/12/20 1709)   norepinephrine 0.02-0.3 mcg/kg/min Last Rate: 0.5 mcg/kg/min (04/13/20 0434)   Pharmacy to dose vancomycin     phenylephrine 0.5-3 mcg/kg/min Last Rate: 6 mcg/kg/min (04/13/20 0636)   propofol 5-50 mcg/kg/min    sodium bicarbonate drip (greater than 75 mEq/bag) 150 mEq Last Rate: 150 mEq (04/13/20 0636)   vasopressin 0.03 Units/min Last Rate: 0.03 Units/min (04/13/20 0839)     PRN Meds:•  aluminum-magnesium hydroxide-simethicone  •  bisacodyl  •  fentaNYL citrate (PF)  •  magnesium hydroxide  •  ondansetron **OR** ondansetron  •  Pharmacy to dose vancomycin  •  sodium chloride  •  sodium chloride  •  vancomycin  Allergies:  No Known Allergies    Objective   Exam:     Vital Signs  Temp:  [96 °F (35.6 °C)-96.9 °F (36.1 °C)] 96.4 °F (35.8 °C)  Heart Rate:  [] 108  BP: ()/(16-71) 116/43  Arterial Line BP: ()/(30-46) 113/42  FiO2 (%):  [80 %-100 %] 100 %  SpO2:  [51 %-100 %] 91 %  on  Flow (L/min):  [2-7] 7;   Device (Oxygen Therapy): ventilator  Body mass index is 22.67 kg/m².  EXAM  General: Elderly white  female in no acute distress.    Head:      Normocephalic and atraumatic.    Eyes:      PERRL/EOM intact, conjunctiva and sclera clear with out nystagmus.    Neck:      No masses, thyromegaly,  trachea central   Lungs:    Bilateral crackles  Heart:      Regular rate and rhythm, no murmur no gallop  Abd:        Soft, nontender, not distended, bowel sounds  positive, no shifting dullness.  Msk:        Not examined  Pulses:   Pulses normal in all 4 extremities.    Extr:        No cyanosis or clubbing--mild edema.    Neuro:    Sedated intubated  Skin:       Intact without lesions or rashes.    Psych:    Unable to determine       Results Review:  I have personally reviewed most recent Data :  BMP @[facility](creatinine:10)  CBC    Results from last 7 days   Lab Units 04/13/20  0426 04/13/20  0112 04/13/20  0044 04/12/20  1802 04/12/20  0545   WBC 10*3/mm3 11.10*  --  10.50 7.90 9.30   HEMOGLOBIN g/dL 11.7*  --  12.1 11.9* 13.2   HEMOGLOBIN, POC g/dL  --  13.8  --   --   --    PLATELETS 10*3/mm3 133*  --  153 158 152     CMP Results from last 7 days   Lab Units 04/13/20  0427 04/13/20  0206 04/12/20  2328 04/12/20  1802 04/12/20  0511   SODIUM mmol/L 147* 152* 147* 146* 144   POTASSIUM mmol/L 5.0 4.9 4.7 4.6 5.1   CHLORIDE mmol/L 104 107 109* 113* 103   CO2 mmol/L 16.0* 17.0* 13.0* 17.0* 27.0   BUN mg/dL 41* 40* 38* 33* 37*   CREATININE mg/dL 2.14* 2.10* 1.94* 1.52* 1.69*   GLUCOSE mg/dL 206* 133* 95 87 81   ALBUMIN g/dL  --   --   --  2.30* 2.90*   BILIRUBIN mg/dL  --   --   --  0.7 0.9   ALK PHOS U/L  --   --   --  52 61   AST (SGOT) U/L  --   --   --  30 39*   ALT (SGPT) U/L  --   --   --  18 23     ABG  Results from last 7 days   Lab Units 04/13/20  0845 04/13/20  0835 04/13/20  0352 04/13/20  0112 04/12/20  2216 04/12/20  1939 04/12/20  1031   PH, ARTERIAL pH units  --  7.220* 7.155* 7.083* 7.167* 7.141* 7.226*   PCO2, ARTERIAL mm Hg  --  40.8 51.3* 44.8 50.2* 63.2* 61.8*   PO2 ART mm Hg  --  124.4* 93.1 67.0* 61.3* 63.4* 64.9*   O2 SATURATION ART %  --  98.0 94.4 84.1* 83.7* 83.5* 87.3*   BASE EXCESS ART mmol/L -9.8* -10.5* -10.7* -16.2* -10.5* -8.2* -3.4*     Imaging Results (Last 24 Hours)     Procedure Component Value Units Date/Time    XR Chest 1 View [[id number]] Collected:  04/13/20 0001     Updated:  04/13/20 0204    Narrative:       EXAMINATION: XR CHEST 1 VW       DATE: 4/13/2020 1:31 AM    INDICATIONS: Code.    COMPARISON: April 12, 2020 11:40 PM    FINDINGS:     Endotracheal tube tip, good position. Right internal jugular central venous catheter tip in the superior vena cava. Enteric tube enters stomach, exits image.    Interlobular septal thickening has increased since the prior examination. Stable pleura, heart, aorta, mediastinum, and bernard.      Impression:         1. Satisfactory placement of the tubes and lines.  2. Worsening interstitial pulmonary edema, now moderate.  3. Stable moderate left pleural fluid collection. Stable small right pleural fluid collection.  4. Atelectasis versus pneumonia in the lower lungs, greater on the left.    Electronically signed by:  Jimi Gan M.D.    4/13/2020 12:03 AM    XR Abdomen KUB [[id number]] Collected:  04/12/20 1426     Updated:  04/12/20 1430    Narrative:       DATE OF EXAM:  4/12/2020 1:32 PM     PROCEDURE:  XR ABDOMEN KUB-     INDICATIONS:  NG tube placement.     COMPARISON:  Chest radiographs and CT chest 04/12/2020.     TECHNIQUE:   Single radiographic view of the abdomen was obtained.        FINDINGS:  Enteric tube terminating in the proximal stomach. Mildly air distended  stomach. Mild air distended small and large bowel loops with a mild  colonic stool burden. No evidence of pneumatosis. No concerning  calcifications. Partially imaged bilateral pleural effusions and left  greater than right bibasilar opacities. Incompletely evaluated  lumbosacral spondylosis. No acute osseous abnormality is identified.        Impression:       Enteric tube terminating in the proximal stomach.     Electronically Signed By-Vincenzo Ghosh On:4/12/2020 2:28 PM  This report was finalized on [id number] by  Vincenzo Ghosh, .    XR Chest 1 View [[id number]] Collected:  04/12/20 1424     Updated:  04/12/20 1428    Narrative:       DATE OF EXAM:  4/12/2020 1:33 PM     PROCEDURE:  XR CHEST 1 VW-     INDICATIONS:  Status post intubation. Rule  out COVID.     COMPARISON:  Radiographs and CT 04/12/2020.     TECHNIQUE:   Single radiographic AP view of the chest was obtained.     FINDINGS:  The study is limited by lordotic patient positioning. Overlying  artifacts. New endotracheal tube terminating in the lower thoracic  trachea, approximately 1 cm above the gregg. New enteric tube  terminating in the proximal stomach. Stable right IJ central venous  catheter. Stable left greater than right moderate bilateral pleural  effusions with underlying bibasilar opacities. No pneumothorax. Stable  cardiomegaly, likely accentuated by technique. No acute osseous  abnormality is identified.        Impression:          1. Limited study demonstrating a new endotracheal tube terminating in  the lower thoracic trachea, approximately 1 cm above the gregg.  2. Stable left greater than right moderate pleural effusions with  underlying bibasilar atelectasis and/or pneumonia.  3. Stable cardiomegaly, likely accentuated by technique.     Electronically Signed By-Vincenzo Ghosh On:4/12/2020 2:26 PM  This report was finalized on [id number] by  Vincenzo Ghosh, .                 Assessment/Plan   Assessment and Plan:         Pleural effusion    ASSESSMENT:  • Acute renal failure: Likely etiology is ATN secondary to significant hemodynamic instability and requiring multiple pressors   • Acute respiratory failure: Secondary to COPD and pneumonia  • Volume status: Likely some pulmonary edema with some peripheral edema.  Patient did receive significant fluid  • Abnormal cardiac enzymes  • Acute extubation of COPD         Plan:     • Follow-up with echo result which is pending  • Worsening respiratory condition at this time likely because of infection  • Worsening acidosis because of sepsis and lactic acidosis  • Hyperkalemia secondary to acidosis and acute renal failure  • Some evidence of volume overload on chest x-ray otherwise no peripheral edema  • At this time patient prognosis is  guarded to poor because of her age and multiple other factors associated with her condition.  • She will be a poor candidate to be initiated on dialysis because of requiring multiple pressors and at this time dialysis will make her hemodynamics worse.  • I will watch patient carefully improve patient acidosis with bicarbonate that will help with hyperkalemia  • If patient hemodynamics start improving will consider renal replacement therapy later today or tomorrow morning  · Sodium bicarbonate will not be given because of the significant hypernetremia  · Follow-up with liver enzymes  · renal ultrasound may be needed that will be ordered tomorrow if urine output is still minimal  · Significant hyperphosphatemia secondary to acute kidney injury  Note started  by Velasquez Shore MD, 04/13/20, 10:18 AM.  [facility] kidney consultant

## 2020-04-14 LAB
BACTERIA SPEC AEROBE CULT: ABNORMAL
BACTERIA SPEC AEROBE CULT: ABNORMAL
GRAM STN SPEC: ABNORMAL
ISOLATED FROM: ABNORMAL

## 2020-04-14 NOTE — PROGRESS NOTES
Case Management Discharge Note      Final Note: Patient passed away                         Final Discharge Disposition Code: 41 -  in medical facility

## 2020-04-15 LAB
BACTERIA SPEC RESP CULT: NO GROWTH
GRAM STN SPEC: NORMAL
GRAM STN SPEC: NORMAL

## 2020-04-17 LAB — BACTERIA SPEC AEROBE CULT: NORMAL
